# Patient Record
Sex: FEMALE | Race: WHITE | NOT HISPANIC OR LATINO | Employment: FULL TIME | URBAN - NONMETROPOLITAN AREA
[De-identification: names, ages, dates, MRNs, and addresses within clinical notes are randomized per-mention and may not be internally consistent; named-entity substitution may affect disease eponyms.]

---

## 2017-01-18 ENCOUNTER — HOSPITAL ENCOUNTER (OUTPATIENT)
Dept: GENERAL RADIOLOGY | Facility: HOSPITAL | Age: 37
Discharge: HOME | End: 2017-01-18
Attending: PHYSICIAN ASSISTANT

## 2018-07-03 ENCOUNTER — TRANSCRIBE ORDERS (OUTPATIENT)
Dept: MAMMOGRAPHY | Facility: HOSPITAL | Age: 38
End: 2018-07-03

## 2018-07-03 DIAGNOSIS — Z12.31 VISIT FOR SCREENING MAMMOGRAM: Primary | ICD-10-CM

## 2018-07-17 ENCOUNTER — HOSPITAL ENCOUNTER (OUTPATIENT)
Dept: MAMMOGRAPHY | Facility: HOSPITAL | Age: 38
Discharge: HOME | End: 2018-07-17
Attending: OBSTETRICS & GYNECOLOGY | Admitting: OBSTETRICS & GYNECOLOGY

## 2018-07-17 DIAGNOSIS — Z12.31 VISIT FOR SCREENING MAMMOGRAM: ICD-10-CM

## 2018-07-17 PROCEDURE — 77067 SCR MAMMO BI INCL CAD: CPT

## 2018-07-17 PROCEDURE — 77063 BREAST TOMOSYNTHESIS BI: CPT

## 2018-07-17 PROCEDURE — 77067 SCR MAMMO BI INCL CAD: CPT | Performed by: RADIOLOGY

## 2018-07-17 PROCEDURE — 77063 BREAST TOMOSYNTHESIS BI: CPT | Performed by: RADIOLOGY

## 2018-10-04 ENCOUNTER — APPOINTMENT (OUTPATIENT)
Dept: LAB | Facility: HOSPITAL | Age: 38
End: 2018-10-04

## 2018-10-04 ENCOUNTER — TRANSCRIBE ORDERS (OUTPATIENT)
Dept: LAB | Facility: HOSPITAL | Age: 38
End: 2018-10-04

## 2018-10-04 DIAGNOSIS — J32.9 SINUSITIS, UNSPECIFIED CHRONICITY, UNSPECIFIED LOCATION: Primary | ICD-10-CM

## 2018-10-04 DIAGNOSIS — R50.9 FEVER, UNSPECIFIED FEVER CAUSE: ICD-10-CM

## 2018-10-04 DIAGNOSIS — R53.83 FATIGUE, UNSPECIFIED TYPE: ICD-10-CM

## 2018-10-04 LAB
ALBUMIN SERPL-MCNC: 4.3 G/DL (ref 3.5–5)
ALBUMIN/GLOB SERPL: 1.1 G/DL (ref 1–2)
ALP SERPL-CCNC: 86 U/L (ref 38–126)
ALT SERPL W P-5'-P-CCNC: 27 U/L (ref 13–69)
ANION GAP SERPL CALCULATED.3IONS-SCNC: 11.6 MMOL/L (ref 10–20)
AST SERPL-CCNC: 25 U/L (ref 15–46)
BASOPHILS # BLD AUTO: 0.04 10*3/MM3 (ref 0–0.2)
BASOPHILS NFR BLD AUTO: 0.5 % (ref 0–2.5)
BILIRUB SERPL-MCNC: 0.3 MG/DL (ref 0.2–1.3)
BUN BLD-MCNC: 8 MG/DL (ref 7–20)
BUN/CREAT SERPL: 11.4 (ref 7.1–23.5)
CALCIUM SPEC-SCNC: 9.1 MG/DL (ref 8.4–10.2)
CHLORIDE SERPL-SCNC: 107 MMOL/L (ref 98–107)
CO2 SERPL-SCNC: 24 MMOL/L (ref 26–30)
CREAT BLD-MCNC: 0.7 MG/DL (ref 0.6–1.3)
DEPRECATED RDW RBC AUTO: 42.6 FL (ref 37–54)
EOSINOPHIL # BLD AUTO: 0.18 10*3/MM3 (ref 0–0.7)
EOSINOPHIL NFR BLD AUTO: 2.2 % (ref 0–7)
ERYTHROCYTE [DISTWIDTH] IN BLOOD BY AUTOMATED COUNT: 15 % (ref 11.5–14.5)
GFR SERPL CREATININE-BSD FRML MDRD: 93 ML/MIN/1.73
GLOBULIN UR ELPH-MCNC: 3.9 GM/DL
GLUCOSE BLD-MCNC: 123 MG/DL (ref 74–98)
HCT VFR BLD AUTO: 41.7 % (ref 37–47)
HGB BLD-MCNC: 13.1 G/DL (ref 12–16)
IMM GRANULOCYTES # BLD: 0.03 10*3/MM3 (ref 0–0.06)
IMM GRANULOCYTES NFR BLD: 0.4 % (ref 0–0.6)
LYMPHOCYTES # BLD AUTO: 1.58 10*3/MM3 (ref 0.6–3.4)
LYMPHOCYTES NFR BLD AUTO: 19.1 % (ref 10–50)
MCH RBC QN AUTO: 24.8 PG (ref 27–31)
MCHC RBC AUTO-ENTMCNC: 31.4 G/DL (ref 30–37)
MCV RBC AUTO: 78.8 FL (ref 81–99)
MONOCYTES # BLD AUTO: 0.43 10*3/MM3 (ref 0–0.9)
MONOCYTES NFR BLD AUTO: 5.2 % (ref 0–12)
NEUTROPHILS # BLD AUTO: 6.01 10*3/MM3 (ref 2–6.9)
NEUTROPHILS NFR BLD AUTO: 72.6 % (ref 37–80)
NRBC BLD MANUAL-RTO: 0 /100 WBC (ref 0–0)
PLATELET # BLD AUTO: 360 10*3/MM3 (ref 130–400)
PMV BLD AUTO: 10.8 FL (ref 6–12)
POTASSIUM BLD-SCNC: 3.6 MMOL/L (ref 3.5–5.1)
PROT SERPL-MCNC: 8.2 G/DL (ref 6.3–8.2)
RBC # BLD AUTO: 5.29 10*6/MM3 (ref 4.2–5.4)
SODIUM BLD-SCNC: 139 MMOL/L (ref 137–145)
WBC NRBC COR # BLD: 8.27 10*3/MM3 (ref 4.8–10.8)

## 2018-10-04 PROCEDURE — 85025 COMPLETE CBC W/AUTO DIFF WBC: CPT | Performed by: NURSE PRACTITIONER

## 2018-10-04 PROCEDURE — 80053 COMPREHEN METABOLIC PANEL: CPT | Performed by: NURSE PRACTITIONER

## 2019-01-05 ENCOUNTER — APPOINTMENT (OUTPATIENT)
Dept: LAB | Facility: HOSPITAL | Age: 39
End: 2019-01-05

## 2019-01-05 ENCOUNTER — TRANSCRIBE ORDERS (OUTPATIENT)
Dept: ADMINISTRATIVE | Facility: HOSPITAL | Age: 39
End: 2019-01-05

## 2019-01-05 DIAGNOSIS — R19.7 DIARRHEA, UNSPECIFIED TYPE: Primary | ICD-10-CM

## 2019-01-05 DIAGNOSIS — Z00.00 HEALTH MAINTENANCE EXAMINATION: ICD-10-CM

## 2019-01-05 LAB
HIV1 P24 AG SER QL: NORMAL
HIV1+2 AB SER QL: NORMAL

## 2019-01-05 PROCEDURE — 86708 HEPATITIS A ANTIBODY: CPT | Performed by: FAMILY MEDICINE

## 2019-01-05 PROCEDURE — 36415 COLL VENOUS BLD VENIPUNCTURE: CPT | Performed by: FAMILY MEDICINE

## 2019-01-05 PROCEDURE — 87899 AGENT NOS ASSAY W/OPTIC: CPT | Performed by: FAMILY MEDICINE

## 2019-01-05 PROCEDURE — G0432 EIA HIV-1/HIV-2 SCREEN: HCPCS | Performed by: FAMILY MEDICINE

## 2019-01-06 LAB
ADV 40+41 DNA STL QL NAA+NON-PROBE: NOT DETECTED
ASTRO TYP 1-8 RNA STL QL NAA+NON-PROBE: NOT DETECTED
C CAYETANENSIS DNA STL QL NAA+NON-PROBE: NOT DETECTED
C DIFF TOX GENS STL QL NAA+PROBE: NOT DETECTED
CAMPY SP DNA.DIARRHEA STL QL NAA+PROBE: NOT DETECTED
CRYPTOSP STL CULT: NOT DETECTED
E COLI DNA SPEC QL NAA+PROBE: NOT DETECTED
E HISTOLYT AG STL-ACNC: NOT DETECTED
EAEC PAA PLAS AGGR+AATA ST NAA+NON-PRB: NOT DETECTED
EC STX1 + STX2 GENES STL NAA+PROBE: NOT DETECTED
EPEC EAE GENE STL QL NAA+NON-PROBE: NOT DETECTED
ETEC LTA+ST1A+ST1B TOX ST NAA+NON-PROBE: NOT DETECTED
G LAMBLIA DNA SPEC QL NAA+PROBE: NOT DETECTED
HAV AB SER QL IA: NEGATIVE
NOROVIRUS GI+II RNA STL QL NAA+NON-PROBE: NOT DETECTED
P SHIGELLOIDES DNA STL QL NAA+NON-PROBE: NOT DETECTED
RV RNA STL NAA+PROBE: NOT DETECTED
SALMONELLA DNA SPEC QL NAA+PROBE: NOT DETECTED
SAPO I+II+IV+V RNA STL QL NAA+NON-PROBE: NOT DETECTED
SHIGELLA SP+EIEC IPAH STL QL NAA+PROBE: NOT DETECTED
V CHOLERAE DNA SPEC QL NAA+PROBE: NOT DETECTED
VIBRIO DNA SPEC NAA+PROBE: NOT DETECTED
YERSINIA STL CULT: NOT DETECTED

## 2019-01-06 PROCEDURE — 87493 C DIFF AMPLIFIED PROBE: CPT | Performed by: FAMILY MEDICINE

## 2019-01-06 PROCEDURE — 87507 IADNA-DNA/RNA PROBE TQ 12-25: CPT | Performed by: FAMILY MEDICINE

## 2019-06-18 ENCOUNTER — HOSPITAL ENCOUNTER (OUTPATIENT)
Dept: ULTRASOUND IMAGING | Facility: HOSPITAL | Age: 39
Discharge: HOME | End: 2019-06-18
Admitting: NURSE PRACTITIONER

## 2019-06-18 DIAGNOSIS — M79.605 PAIN OF LEFT LOWER EXTREMITY: ICD-10-CM

## 2019-06-18 PROCEDURE — 93971 EXTREMITY STUDY: CPT

## 2019-12-10 ENCOUNTER — TRANSCRIBE ORDERS (OUTPATIENT)
Dept: MAMMOGRAPHY | Facility: HOSPITAL | Age: 39
End: 2019-12-10

## 2019-12-10 DIAGNOSIS — Z12.31 VISIT FOR SCREENING MAMMOGRAM: Primary | ICD-10-CM

## 2019-12-23 ENCOUNTER — HOSPITAL ENCOUNTER (OUTPATIENT)
Dept: MAMMOGRAPHY | Facility: HOSPITAL | Age: 39
Discharge: HOME | End: 2019-12-23
Admitting: OBSTETRICS & GYNECOLOGY

## 2019-12-23 DIAGNOSIS — Z12.31 VISIT FOR SCREENING MAMMOGRAM: ICD-10-CM

## 2019-12-23 PROCEDURE — 77063 BREAST TOMOSYNTHESIS BI: CPT | Performed by: RADIOLOGY

## 2019-12-23 PROCEDURE — 77067 SCR MAMMO BI INCL CAD: CPT

## 2019-12-23 PROCEDURE — 77067 SCR MAMMO BI INCL CAD: CPT | Performed by: RADIOLOGY

## 2019-12-23 PROCEDURE — 77063 BREAST TOMOSYNTHESIS BI: CPT

## 2020-03-11 ENCOUNTER — HOSPITAL ENCOUNTER (OUTPATIENT)
Dept: CT IMAGING | Facility: HOSPITAL | Age: 40
Discharge: HOME | End: 2020-03-11
Admitting: PHYSICIAN ASSISTANT

## 2020-03-11 ENCOUNTER — TRANSCRIBE ORDERS (OUTPATIENT)
Dept: ADMINISTRATIVE | Facility: HOSPITAL | Age: 40
End: 2020-03-11

## 2020-03-11 DIAGNOSIS — R31.9 HEMATURIA SYNDROME: ICD-10-CM

## 2020-03-11 DIAGNOSIS — M54.9 BACK PAIN, UNSPECIFIED BACK LOCATION, UNSPECIFIED BACK PAIN LATERALITY, UNSPECIFIED CHRONICITY: ICD-10-CM

## 2020-03-11 DIAGNOSIS — R31.9 HEMATURIA SYNDROME: Primary | ICD-10-CM

## 2020-03-11 PROCEDURE — 74176 CT ABD & PELVIS W/O CONTRAST: CPT

## 2020-03-16 ENCOUNTER — APPOINTMENT (OUTPATIENT)
Dept: CT IMAGING | Facility: HOSPITAL | Age: 40
End: 2020-03-16

## 2020-04-17 LAB
BILIRUB BLD-MCNC: NEGATIVE MG/DL
CLARITY, POC: CLEAR
COLOR UR: YELLOW
GLUCOSE UR STRIP-MCNC: NEGATIVE MG/DL
KETONES UR QL: NEGATIVE
LEUKOCYTE EST, POC: NEGATIVE
NITRITE UR-MCNC: NEGATIVE MG/ML
PH UR: 6 [PH] (ref 5–8)
PROT UR STRIP-MCNC: NEGATIVE MG/DL
RBC # UR STRIP: NEGATIVE /UL
SP GR UR: 1.03 (ref 1–1.03)
UROBILINOGEN UR QL: NORMAL

## 2020-04-20 ENCOUNTER — TELEMEDICINE (OUTPATIENT)
Dept: UROLOGY | Facility: CLINIC | Age: 40
End: 2020-04-20

## 2020-04-20 DIAGNOSIS — N20.0 KIDNEY STONE: ICD-10-CM

## 2020-04-20 DIAGNOSIS — R31.29 OTHER MICROSCOPIC HEMATURIA: Primary | ICD-10-CM

## 2020-04-20 PROCEDURE — 99243 OFF/OP CNSLTJ NEW/EST LOW 30: CPT | Performed by: UROLOGY

## 2020-04-20 NOTE — PROGRESS NOTES
Chief Complaint  Kidney Stone    Referring Provider  Celeste Medrano PA    HPI  Ms. Rizwan Johnson is a 40 y.o. female who presents for further management of nephrolithiasis.    She presented to ER w/ gross hematuria and urgency and frequency and was diagnosed with a small nonobstructing right stone. She presents today for follow up.  She is asymptomatic today.  No fever, chills, nausea, vomiting.  No dysuria. Never had any procedures.     Stone related history:  Family history of kidney stones  yes  Renal disease or anatomic abnormality: yes duplicated left kidney  Malabsorptive disease or gastric bypass: no  Frequent UTI's    yes  Parathyroid disease    no    Dietary Considerations  Soda - 0 per day  Fast food - 0-2 per week  Water - 6 glasses per day  No Adds salt to foods    BMI 34    Past Medical History  Past Medical History:   Diagnosis Date   • Disease of thyroid gland    • GERD (gastroesophageal reflux disease)    • RA (rheumatoid arthritis) (CMS/[facility])    • Rheumatoid arthritis (CMS/[facility])        Past Surgical History  No past surgical history on file.    Medications    Current Outpatient Medications:   •  ALPRAZolam (XANAX) 0.25 MG tablet, Take 0.25 mg by mouth 2 (Two) Times a Day As Needed for Anxiety., Disp: , Rfl:   •  Esomeprazole Magnesium (NEXIUM PO), Take  by mouth., Disp: , Rfl:   •  fluticasone-salmeterol (ADVAIR) 500-50 MCG/DOSE DISKUS, Inhale 1 puff 2 (Two) Times a Day. Indications: Asthma, Disp: 60 each, Rfl: 0  •  ipratropium-albuterol (DUO-NEB) 0.5-2.5 mg/3 ml nebulizer, Nebulize q 4 h prn wheezing / shortness of breath, Disp: 360 mL, Rfl: 0  •  levothyroxine (SYNTHROID, LEVOTHROID) 25 MCG tablet, Take 25 mcg by mouth Daily., Disp: , Rfl:   •  montelukast (SINGULAIR) 10 MG tablet, Take 10 mg by mouth Every Night., Disp: , Rfl:   •  Tofacitinib Citrate (XELJANZ XR PO), Take  by mouth., Disp: , Rfl:     Allergies  Allergies   Allergen Reactions   • Arithmin [Antazoline] Hives   • Bactrim  [Sulfamethoxazole-Trimethoprim] Hives   • Ceclor [Cefaclor] Hives   • Keflex [Cephalexin] Hives   • Penicillins Hives       Social History  Social History     Socioeconomic History   • Marital status:      Spouse name: Not on file   • Number of children: Not on file   • Years of education: Not on file   • Highest education level: Not on file   Tobacco Use   • Smoking status: Former Smoker   • Smokeless tobacco: Never Used   Substance and Sexual Activity   • Alcohol use: Never     Frequency: Never       Family History  Family History   Problem Relation Age of Onset   • Breast cancer Neg Hx    • Ovarian cancer Neg Hx        Review of Systems  Constitutional: No fevers or chills  Skin: Negative for rash  Endocrine: No heat/cold intolerance   Cardiovascular: Negative for chest pain or dyspnea on exertion  Respiratory: Negative for shortness of breath or wheezing  Gastrointestinal: No constipation, nausea or vomiting  Genitourinary: No gross hematuria   Musculoskeletal: Negative for low back pain  Neurological:  Negative for frequent headaches or dizziness  Lymph/Heme: Negative for leg swelling or calf pain.    Physical Exam  There were no vitals taken for this visit.  Constitutional: NAD, WDWN.   HEENT: NCAT. Conjunctivae normal.  MMM.  Endocrine: no clear thyromegaly    Cardiovascular: No apparent chest masses or complaints of palpitations.  Pulmonary/Chest: Respirations are even and non-labored bilaterally.  Neurological: A + O x 3.  Cranial Nerves II-XII grossly intact.  Extremities: JORDY x 4, Warm. No clubbing.  No cyanosis.    Skin: Pink, warm and dry.  No rashes noted.  Gastrointestinal: No apparent abdominal distention    Labs  Lab Results   Component Value Date    GLUCOSE 123 (H) 10/04/2018    BUN 8 10/04/2018    CREATININE 0.70 10/04/2018    EGFRIFNONA 93 10/04/2018    BCR 11.4 10/04/2018    K 3.6 10/04/2018    CO2 24.0 (L) 10/04/2018    CALCIUM 9.1 10/04/2018    ALBUMIN 4.30 10/04/2018    LABIL2 1.0  03/04/2014    AST 25 10/04/2018    ALT 27 10/04/2018       Lab Results   Component Value Date    WBC 8.27 10/04/2018    HGB 13.1 10/04/2018    HCT 41.7 10/04/2018    MCV 78.8 (L) 10/04/2018     10/04/2018       No results found for: LDH, URICACID    Lab Results   Component Value Date    CALCIUM 9.1 10/04/2018       Brief Urine Lab Results  (Last result in the past 365 days)      Color   Clarity   Blood   Leuk Est   Nitrite   Protein   CREAT   Urine HCG        04/17/20 1321 Yellow Clear Negative Negative Negative Negative               No results found for: URINECX    )No components found for: STONEANALYSI      Radiologic Studies    Ct Abdomen Pelvis Stone Protocol    Result Date: 3/11/2020  Small nonobstructing renal stones with no hydronephrosis or ureterolithiasis.     1541.90 mGy.cm. 33.19 mGy  This study was performed with techniques to keep radiation doses as low as reasonably achievable (ALARA). Individualized dose reduction techniques using automated exposure control or adjustment of mA and/or kV according to the patient size were employed.  This report was finalized on 3/11/2020 4:31 PM by Ana M Ashby M.D..      I have personally reviewed these labs and images.  Small tiny nonobstructing lower pole right renal stone.  Duplicating left collecting system but no stones in it.  No hydronephrosis.  Full bladder.      Assessment  Ms. Rizwan Johnson is a 40 y.o. 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  Main stone risk factors are weight, family history, frequent urinary infections.  She has a duplicated kidney but I do not consider this a risk factor, as she has no hydronephrosis.      Plan  1. Litholink  2. FU w/ video visit in 3 weeks to discuss    This visit was conducted via video due to the current COVID-19 pandemic.  The patient completed a consent form at a time.

## 2020-05-11 ENCOUNTER — TELEMEDICINE (OUTPATIENT)
Dept: UROLOGY | Facility: CLINIC | Age: 40
End: 2020-05-11

## 2020-05-11 DIAGNOSIS — N20.0 NEPHROLITHIASIS: Primary | ICD-10-CM

## 2020-05-11 PROCEDURE — 99213 OFFICE O/P EST LOW 20 MIN: CPT | Performed by: UROLOGY

## 2020-05-11 NOTE — PROGRESS NOTES
Chief Complaint  Kidney Stone    Referring Provider  No ref. provider found    HPI  Ms. Rizwan Johnson is a 40 y.o. female who presents for further management of nephrolithiasis.    She presented to ER w/ gross hematuria and urgency and frequency and was diagnosed with a small nonobstructing right stone. She presents today for video follow up.  She is asymptomatic today.  No fever, chills, nausea, vomiting.  No dysuria. Never had any procedures.     Today we are reviewing her Litholink, which demonstrates low urine volume at 1.2 L, and increase urine sodium at 200.    Stone related history:  Family history of kidney stones  yes  Renal disease or anatomic abnormality: yes duplicated left kidney  Malabsorptive disease or gastric bypass: no  Frequent UTI's    yes  Parathyroid disease    no    Dietary Considerations  Soda - 0 per day  Fast food - 0-2 per week  Water - 6 glasses per day  No Adds salt to foods, admits to eating lots of processed foods, especially lunch meat.    BMI 34    Past Medical History  Past Medical History:   Diagnosis Date   • Disease of thyroid gland    • GERD (gastroesophageal reflux disease)    • RA (rheumatoid arthritis) (CMS/[facility])    • Rheumatoid arthritis (CMS/[facility])        Past Surgical History  No past surgical history on file.    Medications    Current Outpatient Medications:   •  ALPRAZolam (XANAX) 0.25 MG tablet, Take 0.25 mg by mouth 2 (Two) Times a Day As Needed for Anxiety., Disp: , Rfl:   •  Esomeprazole Magnesium (NEXIUM PO), Take  by mouth., Disp: , Rfl:   •  fluticasone-salmeterol (ADVAIR) 500-50 MCG/DOSE DISKUS, Inhale 1 puff 2 (Two) Times a Day. Indications: Asthma, Disp: 60 each, Rfl: 0  •  ipratropium-albuterol (DUO-NEB) 0.5-2.5 mg/3 ml nebulizer, Nebulize q 4 h prn wheezing / shortness of breath, Disp: 360 mL, Rfl: 0  •  levothyroxine (SYNTHROID, LEVOTHROID) 25 MCG tablet, Take 25 mcg by mouth Daily., Disp: , Rfl:   •  montelukast (SINGULAIR) 10 MG tablet, Take 10 mg by mouth  Every Night., Disp: , Rfl:   •  Tofacitinib Citrate (XELJANZ XR PO), Take  by mouth., Disp: , Rfl:     Allergies  Allergies   Allergen Reactions   • Arithmin [Antazoline] Hives   • Bactrim [Sulfamethoxazole-Trimethoprim] Hives   • Ceclor [Cefaclor] Hives   • Keflex [Cephalexin] Hives   • Penicillins Hives       Social History  Social History     Socioeconomic History   • Marital status:      Spouse name: Not on file   • Number of children: Not on file   • Years of education: Not on file   • Highest education level: Not on file   Tobacco Use   • Smoking status: Former Smoker   • Smokeless tobacco: Never Used   Substance and Sexual Activity   • Alcohol use: Never     Frequency: Never       Family History  Family History   Problem Relation Age of Onset   • Breast cancer Neg Hx    • Ovarian cancer Neg Hx        Review of Systems  Constitutional: No fevers or chills  Skin: Negative for rash  Endocrine: No heat/cold intolerance   Cardiovascular: Negative for chest pain or dyspnea on exertion  Respiratory: Negative for shortness of breath or wheezing  Gastrointestinal: No constipation, nausea or vomiting  Genitourinary: No gross hematuria   Musculoskeletal: Negative for low back pain  Neurological:  Negative for frequent headaches or dizziness  Lymph/Heme: Negative for leg swelling or calf pain.    Physical Exam  There were no vitals taken for this visit.  Constitutional: NAD, WDWN.   HEENT: NCAT. Conjunctivae normal.  MMM.  Endocrine: no clear thyromegaly    Cardiovascular: No apparent chest masses or complaints of palpitations.  Pulmonary/Chest: Respirations are even and non-labored bilaterally.  Neurological: A + O x 3.  Cranial Nerves II-XII grossly intact.  Extremities: JORDY x 4, Warm. No clubbing.  No cyanosis.    Skin: Pink, warm and dry.  No rashes noted.  Gastrointestinal: No apparent abdominal distention    Labs  Lab Results   Component Value Date    GLUCOSE 123 (H) 10/04/2018    BUN 8 10/04/2018     CREATININE 0.70 10/04/2018    EGFRIFNONA 93 10/04/2018    BCR 11.4 10/04/2018    K 3.6 10/04/2018    CO2 24.0 (L) 10/04/2018    CALCIUM 9.1 10/04/2018    ALBUMIN 4.30 10/04/2018    LABIL2 1.0 03/04/2014    AST 25 10/04/2018    ALT 27 10/04/2018       Lab Results   Component Value Date    WBC 8.27 10/04/2018    HGB 13.1 10/04/2018    HCT 41.7 10/04/2018    MCV 78.8 (L) 10/04/2018     10/04/2018       No results found for: LDH, URICACID    Lab Results   Component Value Date    CALCIUM 9.1 10/04/2018       Brief Urine Lab Results  (Last result in the past 365 days)      Color   Clarity   Blood   Leuk Est   Nitrite   Protein   CREAT   Urine HCG        04/17/20 1321 Yellow Clear Negative Negative Negative Negative               No results found for: URINECX    )No components found for: STONEANALYSI      Radiologic Studies    Ct Abdomen Pelvis Stone Protocol    Result Date: 3/11/2020  Small nonobstructing renal stones with no hydronephrosis or ureterolithiasis.     1541.90 mGy.cm. 33.19 mGy  This study was performed with techniques to keep radiation doses as low as reasonably achievable (ALARA). Individualized dose reduction techniques using automated exposure control or adjustment of mA and/or kV according to the patient size were employed.  This report was finalized on 3/11/2020 4:31 PM by Ana M Ashby M.D..      I have personally reviewed these labs and images.  Small tiny nonobstructing lower pole right renal stone.  Duplicating left collecting system but no stones in it.  No hydronephrosis.  Full bladder.      Assessment  Ms. Rizwan Johnson is a 40 y.o. 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  Main stone risk factors are weight, family history, frequent urinary infections.  She has a duplicated kidney but I do not consider this a risk factor, as she has no hydronephrosis.    Her litholink demonstrated urine volume of 1.2L and increased urine sodium.  She does admit to eating a large  amount of processed and preserved lunch meats.      Plan  1. FU in 1 yr w/ KUB  2.  Increase water intake by 1.5 times, reduce sodium intake.    This was an audio and video enabled telemedicine encounter.  Consent was obtained ahead of time.

## 2020-06-03 ENCOUNTER — OFFICE VISIT (OUTPATIENT)
Dept: ORTHOPEDIC SURGERY | Facility: CLINIC | Age: 40
End: 2020-06-03

## 2020-06-03 VITALS — WEIGHT: 207 LBS | BODY MASS INDEX: 35.34 KG/M2 | RESPIRATION RATE: 18 BRPM | HEIGHT: 64 IN

## 2020-06-03 DIAGNOSIS — Z87.39 PERSONAL HISTORY OF RHEUMATOID ARTHRITIS: ICD-10-CM

## 2020-06-03 DIAGNOSIS — M25.562 ARTHRALGIA OF LEFT KNEE: Primary | ICD-10-CM

## 2020-06-03 DIAGNOSIS — M77.51 RETROCALCANEAL BURSITIS (BACK OF HEEL), RIGHT: ICD-10-CM

## 2020-06-03 DIAGNOSIS — M76.61 ACHILLES TENDINITIS, RIGHT LEG: ICD-10-CM

## 2020-06-03 PROCEDURE — 99203 OFFICE O/P NEW LOW 30 MIN: CPT | Performed by: PHYSICIAN ASSISTANT

## 2020-06-03 RX ORDER — ALBUTEROL SULFATE 0.63 MG/3ML
SOLUTION RESPIRATORY (INHALATION)
COMMUNITY
Start: 2020-04-07 | End: 2023-04-04

## 2020-06-03 RX ORDER — TOFACITINIB 11 MG/1
11 TABLET, FILM COATED, EXTENDED RELEASE ORAL DAILY
COMMUNITY
Start: 2020-05-11

## 2020-06-03 NOTE — PROGRESS NOTES
Subjective   Patient ID: Ric Johnson is a 40 y.o. right hand dominant female  Pain of the Left Knee (No specific injury, reports she bent down to  something a couple weeks ago and heard a pop and experienced sudden pain and swelling at the back of the knee, seen at [facility] 5/22, better since then)         History of Present Illness    Patient presents with complaints of intermittent left knee pain without injury or trauma.  The pain began several weeks prior.  Pain was located to the inner aspect of the left knee.  She went to the urgent care had x-rays was told she had arthritis.  Patient does have a personal history of rheumatoid arthritis and does not like to take anti-inflammatories.  There is no instability of the left knee.  No mechanical locking of the left knee.  She states some days the left knee pain is noticeable and other days it is not as bad.    Patient would also like to have her right posterior ankle evaluated.  She states she is experienced right posterior ankle pain for several years.  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   She has tried shoe inserts without improvement.    Pain Score: 5  Pain Location: Knee                                           Past Medical History:   Diagnosis Date   • Disease of thyroid gland    • GERD (gastroesophageal reflux disease)    • RA (rheumatoid arthritis) (CMS/[facility])    • Rheumatoid arthritis (CMS/[facility])         History reviewed. No pertinent surgical history.    Family History   Problem Relation Age of Onset   • Breast cancer Neg Hx    • Ovarian cancer Neg Hx        Social History     Socioeconomic History   • Marital status:      Spouse name: Not on file   • Number of children: Not on file   • Years of education: Not on file   • Highest education level: Not on file   Tobacco Use   • Smoking status: Former Smoker   • Smokeless tobacco: Never Used   Substance and Sexual Activity    • Alcohol use: Never     Frequency: Never   • Sexual activity: Defer         Current Outpatient Medications:   •  albuterol (ACCUNEB) 0.63 MG/3ML nebulizer solution, , Disp: , Rfl:   •  ALPRAZolam (XANAX) 0.25 MG tablet, Take 0.25 mg by mouth 2 (Two) Times a Day As Needed for Anxiety., Disp: , Rfl:   •  Cholecalciferol (VITAMIN D3) 125 MCG (5000 UT) capsule capsule, Take 5,000 Units by mouth Daily., Disp: , Rfl:   •  diclofenac (VOLTAREN) 1 % gel gel, Apply 4 g topically to the appropriate area as directed 4 (Four) Times a Day As Needed (as needed for pain)., Disp: 1 tube, Rfl: 0  •  Esomeprazole Magnesium (NEXIUM PO), Take  by mouth., Disp: , Rfl:   •  levothyroxine (SYNTHROID, LEVOTHROID) 25 MCG tablet, Take 25 mcg by mouth Daily., Disp: , Rfl:   •  montelukast (SINGULAIR) 10 MG tablet, Take 10 mg by mouth Every Night., Disp: , Rfl:   •  Tofacitinib Citrate (XELJANZ XR PO), Take  by mouth., Disp: , Rfl:   •  vitamin C (ASCORBIC ACID) 500 MG tablet, Take 1,000 mg by mouth Daily., Disp: , Rfl:   •  XELJANZ XR 11 MG tablet sustained-release 24 hour, , Disp: , Rfl:     Allergies   Allergen Reactions   • Arithmin [Antazoline] Hives   • Bactrim [Sulfamethoxazole-Trimethoprim] Hives   • Ceclor [Cefaclor] Hives   • Keflex [Cephalexin] Hives   • Penicillins Hives       Review of Systems   Constitutional: Negative for diaphoresis, fever and unexpected weight change.   HENT: Negative for dental problem and sore throat.    Eyes: Negative for visual disturbance.   Respiratory: Negative for shortness of breath.    Cardiovascular: Negative for chest pain.   Gastrointestinal: Negative for abdominal pain, constipation, diarrhea, nausea and vomiting.   Genitourinary: Negative for difficulty urinating and frequency.   Musculoskeletal: Positive for arthralgias.   Neurological: Negative for headaches.   Hematological: Does not bruise/bleed easily.       I have reviewed the medical and surgical history, family history, social  "history, medications, and/or allergies, and the review of systems of this report.    Objective   Resp 18   Ht 162.6 cm (64\")   Wt 93.9 kg (207 lb)   BMI 35.53 kg/m²    Physical Exam   Constitutional: She is oriented to person, place, and time. She appears well-developed and well-nourished.   Pulmonary/Chest: Effort normal.   Musculoskeletal:        Left knee: She exhibits abnormal meniscus. She exhibits normal range of motion, no swelling, no effusion, no ecchymosis, no deformity, no laceration, no LCL laxity, no bony tenderness and no MCL laxity. Tenderness found. Medial joint line and MCL tenderness noted. No patellar tendon tenderness noted.        Right ankle: She exhibits no swelling and no ecchymosis. No lateral malleolus, no medial malleolus, no AITFL, no CF ligament, no head of 5th metatarsal and no proximal fibula tenderness found. Achilles tendon exhibits pain. Achilles tendon exhibits no defect and normal Whitney's test results.        Right foot: There is normal range of motion, no tenderness and no bony tenderness.        Neurological: She is alert and oriented to person, place, and time.   Psychiatric: She has a normal mood and affect.   Nursing note and vitals reviewed.    Right Ankle Exam     Muscle Strength   The patient has normal right ankle strength.    Tests   Anterior drawer: negative  Varus tilt: negative    Other   Erythema: absent  Sensation: normal  Pulse: present       Left Knee Exam     Range of Motion   Extension: 5   Flexion: 100     Tests   Madalyn:  Medial - positive Lateral - negative  Varus: negative Valgus: negative    Other   Sensation: normal  Pulse: present  Effusion: no effusion present           Extremity DVT signs are negative on physical exam with negative Rambo sign, no calf pain, no palpable cords and no skin tone change   Neurologic Exam     Mental Status   Oriented to person, place, and time.              Assessment/Plan   Independent Review of Radiographic Studies: "    FINAL REPORT     CLINICAL HISTORY:  right ankle pain     FINDINGS:  RIGHT ANKLE:  Three views of the right ankle were obtained.  There is no acute fracture or dislocation. The mortise is  intact.  There is moderate degenerative change of the tibiotalar  joint.  There is a small tibiotalar joint effusion.  There is a  small plantar calcaneal spur.     IMPRESSION:  Moderate degenerative changes at the tibiotalar joint with small  joint effusion.     Authenticated by Charles Christian III, MD on 05/13/2019  07:48:28 AM    X-ray of the left knee 2 views weightbearing performed in the office for the evaluation of knee pain.  No comparison films are available to review.  No acute fracture.  There does appear to be medial joint space narrowing with medial distal femoral condyle spurring    Procedures       Ric was seen today for pain.    Diagnoses and all orders for this visit:    Arthralgia of left knee  -     XR Knee 1 or 2 View Left    Personal history of rheumatoid arthritis    Achilles tendinitis, right leg    Retrocalcaneal bursitis (back of heel), right       Orthopedic activities reviewed and patient expressed appreciation  Discussion of orthopedic goals  Risk, benefits, and merits of treatment alternatives reviewed with the patient and questions answered  Reduced physical activity as appropriate  Ice, heat, and/or modalities as beneficial    Recommendations/Plan:  Exercise, medications, injections, other patient advice, and return appointment as noted.  Patient is encouraged to call or return for any issues or concerns.    Patient agreeable to call or return sooner for any concerns.  She may RTC if the left knee symptoms worsen/change-  Cortisone injection and MRI of left knee in future.     Wear the pneumatic boot to right foot/ankle X 4 - 6 weeks-if no improvement may need MRI, formal therapy and referral to foot and ankle subspecialist    Patient states she does have a pneumatic boot at home that was  provided in May 2019.  She is encouraged to use the pneumatic boot to rest the Achilles tendon and bursa ice the ankle and apply topical Voltaren gel 3 times daily as directed.  Avoid flip-flops, sandals and slip on shoes.      Patient politely declined a left knee cortisone injection today stating the pain is not severe enough for injection             EMR Dragon-transcription disclaimer:  This encounter note is an electronic transcription of spoken language to printed text.  Electronic transcription of spoken language may permit erroneous or at times nonsensical words or phrases to be inadvertently transcribed.  Although I have reviewed the note for such errors, some may still exist  Answers for HPI/ROS submitted by the patient on 6/2/2020   What is the primary reason for your visit?: Other  Please describe your symptoms.: Pain in left knee and leg.  Have you had these symptoms before?: No  How long have you been having these symptoms?: 1-2 weeks  Please describe any probable cause for these symptoms. : I bent to pick something up, and my knee popped loudly. It’s been hurting since.

## 2020-12-29 ENCOUNTER — OFFICE VISIT (OUTPATIENT)
Dept: OBSTETRICS AND GYNECOLOGY | Facility: CLINIC | Age: 40
End: 2020-12-29

## 2020-12-29 VITALS
BODY MASS INDEX: 34.55 KG/M2 | SYSTOLIC BLOOD PRESSURE: 122 MMHG | WEIGHT: 195 LBS | HEIGHT: 63 IN | DIASTOLIC BLOOD PRESSURE: 78 MMHG

## 2020-12-29 DIAGNOSIS — Z98.890 HISTORY OF LOOP ELECTRICAL EXCISION PROCEDURE (LEEP): ICD-10-CM

## 2020-12-29 DIAGNOSIS — Z00.00 ANNUAL PHYSICAL EXAM: Primary | ICD-10-CM

## 2020-12-29 DIAGNOSIS — Z87.42 HISTORY OF ABNORMAL CERVICAL PAP SMEAR: ICD-10-CM

## 2020-12-29 DIAGNOSIS — Z30.431 IUD CHECK UP: ICD-10-CM

## 2020-12-29 PROCEDURE — 99396 PREV VISIT EST AGE 40-64: CPT | Performed by: OBSTETRICS & GYNECOLOGY

## 2020-12-29 RX ORDER — ALBUTEROL SULFATE 90 UG/1
AEROSOL, METERED RESPIRATORY (INHALATION)
COMMUNITY
Start: 2020-11-11 | End: 2023-02-17

## 2020-12-29 NOTE — PROGRESS NOTES
GYN Annual Exam   She called her make her mammogram apt and she had a breast lump so they would not schedule her apt.    CC - Here for annual exam.   Several antibiotic allergies - can take Suprax Cipro and Cefdinir     Subjective   HPI  Ric Johnson is a 40 y.o. female, , who presents for annual well woman exam and breast lump.  Patient's last menstrual period was 2020 (exact date).  Periods are rare with her Mirena IUD, placed 2018 for irreg periods and uterine fibroids. Dysmenorrhea:mild, occurring first 1-2 days of flow.  Patient reports problems with: breast lump under her right underarm  Partner Status: Marital Status: .  New Partners since last visit: no.  Desires STD Screening:  no.    Additional OB/GYN History   Current contraception: IUD  Last Pap :   Last Completed Pap Smear       Status Date      PAP SMEAR Done 2019 in [place]        History of abnormal Pap smear: yes LEEP  Family history of uterine, colon, breast, or ovarian cancer: no  Performs monthly Self-Breast Exam: yes - she is not comfortable with her exams  Last mammogram:   Last Completed Mammogram       Status Date      MAMMOGRAM Done 2019 MAMMO SCREENING DIGITAL TOMOSYNTHESIS BILATERAL W CAD     Patient has more history with this topic...        Feelings of Anxiety or Depression: yes stable  Tobacco Usage?: No   OB History        4    Para   1    Term   1            AB   3    Living   1       SAB   3    TAB        Ectopic        Molar        Multiple        Live Births   1                Health Maintenance   Topic Date Due   • Annual Gynecologic Pelvic and Breast Exam  1978   • ANNUAL PHYSICAL  1981   • Pneumococcal Vaccine 0-64 (1 of 1 - PPSV23) 1984   • HEPATITIS C SCREENING  2020   • INFLUENZA VACCINE  2020   • MAMMOGRAM  2020   • PAP SMEAR  2022   • TDAP/TD VACCINES (2 - Tdap) 2030   • MENINGOCOCCAL VACCINE  Aged Out         Current  Outpatient Medications:   •  albuterol sulfate  (90 Base) MCG/ACT inhaler, , Disp: , Rfl:   •  ALPRAZolam (XANAX) 0.25 MG tablet, Take 0.25 mg by mouth 2 (Two) Times a Day As Needed for Anxiety., Disp: , Rfl:   •  Cholecalciferol (VITAMIN D3) 125 MCG (5000 UT) capsule capsule, Take 5,000 Units by mouth Daily., Disp: , Rfl:   •  Esomeprazole Magnesium (NEXIUM PO), Take  by mouth., Disp: , Rfl:   •  levonorgestrel (Mirena, 52 MG,) 20 MCG/24HR IUD, 1 each by Intrauterine route 1 (One) Time. Placed 7/17/2018 with Dr. Munoz, Disp: , Rfl:   •  levothyroxine (SYNTHROID, LEVOTHROID) 25 MCG tablet, Take 25 mcg by mouth Daily., Disp: , Rfl:   •  montelukast (SINGULAIR) 10 MG tablet, Take 10 mg by mouth Every Night., Disp: , Rfl:   •  Tofacitinib Citrate (XELJANZ XR PO), Take  by mouth., Disp: , Rfl:   •  vitamin C (ASCORBIC ACID) 500 MG tablet, Take 1,000 mg by mouth Daily., Disp: , Rfl:   •  albuterol (ACCUNEB) 0.63 MG/3ML nebulizer solution, , Disp: , Rfl:   •  diclofenac (VOLTAREN) 1 % gel gel, Apply 4 g topically to the appropriate area as directed 4 (Four) Times a Day As Needed (as needed for pain)., Disp: 1 tube, Rfl: 0  •  XELJANZ XR 11 MG tablet sustained-release 24 hour, , Disp: , Rfl:     The additional following portions of the patient's history were reviewed and updated as appropriate: allergies, current medications, past family history, past medical history, past social history, past surgical history and problem list.    Review of Systems   Constitutional: Negative.    HENT: Negative.    Eyes: Negative.    Respiratory: Negative.    Cardiovascular: Negative.    Gastrointestinal: Negative.    Endocrine: Negative.    Genitourinary: Negative.  Positive for breast lump and breast pain.   Musculoskeletal: Negative.    Neurological: Negative.    Hematological: Negative.    Psychiatric/Behavioral: Negative.        I have reviewed and agree with the HPI, ROS, and historical information as entered above. Han  "Los Munoz MD    Objective   /78   Ht 160 cm (63\")   Wt 88.5 kg (195 lb)   LMP 12/26/2020 (Exact Date)   Breastfeeding No   BMI 34.54 kg/m²     PE    Breast: Without masses ,nontender, no skin changes or retractions  Axilla: Normal, no lymphadenopathy  Heart: Regular rate no murmurs rubs or gallops  Lungs: Clear to auscultation, normal breath sounds bilaterally  Abdomen: Soft nontender, no hepatosplenomegaly, no guarding or rebound, no masses  Pelvic exam  External genitalia: Normal introitus and vulva  Vagina: Normal mucosa no bleeding inflammation or discharge  Bladder: Normal position nontender  Urethral meatus and urethra: Normal nontender  Cervix: No lesions, no discharge, bleeding or inflammation, IUD string visible  Bimanual: Nontender adnexa clear, no sign of uterine or ovarian enlargement  Anal: No external lesions or hemorrhoids    Breast exam normal patient will get a mammogram soon         Assessment/Plan     Assessment     Problem List Items Addressed This Visit        Other    IUD check up    Annual physical exam - Primary    Relevant Orders    Pap IG, HPV-hr    Mammo Screening Digital Tomosynthesis Bilateral With CAD    History of abnormal cervical Pap smear      Other Visit Diagnoses     History of loop electrical excision procedure (LEEP)        Relevant Orders    Pap IG, HPV-hr          1. GYN annual well woman exam.   2. Normal exam IUD in place    Plan     1. Next pap due in: 1 year  2. Fibrocystic breast changes - Encouraged decreasing caffeine, supportive bra, low dose vitamin E supplementation.  3. Reviewed HPV guidelines and she would like to continue yearly paps regardless.   4. Reviewed monthly self breast exams.  Instructed to call with lumps, pain, or breast discharge.  Yearly mammograms ordered.  5. Reviewed exercise and weight loss as preventative health measures.  Activity recommends - Adult 150-300 min/week of multi-component physical activities that include balance " training, aerobic and physical strengthening.  Disabled or ill adults should still try to fulfill these requirements, with modifications based on their conditions.  Patient with a history of abnormal Paps and a LEEP will get yearly Paps for now  Healthy lifestyle changes including diet and exercise reviewed  Preventative health initiatives reviewed    Han Munoz MD  12/29/2020

## 2021-01-04 DIAGNOSIS — Z00.00 ANNUAL PHYSICAL EXAM: ICD-10-CM

## 2021-01-04 DIAGNOSIS — Z98.890 HISTORY OF LOOP ELECTRICAL EXCISION PROCEDURE (LEEP): ICD-10-CM

## 2021-01-11 ENCOUNTER — APPOINTMENT (OUTPATIENT)
Dept: MAMMOGRAPHY | Facility: HOSPITAL | Age: 41
End: 2021-01-11

## 2021-01-12 ENCOUNTER — HOSPITAL ENCOUNTER (OUTPATIENT)
Dept: MAMMOGRAPHY | Facility: HOSPITAL | Age: 41
Discharge: HOME | End: 2021-01-12
Admitting: OBSTETRICS & GYNECOLOGY

## 2021-01-12 DIAGNOSIS — Z00.00 ANNUAL PHYSICAL EXAM: ICD-10-CM

## 2021-01-12 PROCEDURE — 77067 SCR MAMMO BI INCL CAD: CPT

## 2021-01-12 PROCEDURE — 77063 BREAST TOMOSYNTHESIS BI: CPT

## 2021-01-12 PROCEDURE — 77067 SCR MAMMO BI INCL CAD: CPT | Performed by: RADIOLOGY

## 2021-01-12 PROCEDURE — 77063 BREAST TOMOSYNTHESIS BI: CPT | Performed by: RADIOLOGY

## 2021-01-21 PROCEDURE — U0004 COV-19 TEST NON-CDC HGH THRU: HCPCS | Performed by: PHYSICIAN ASSISTANT

## 2021-01-29 ENCOUNTER — TRANSCRIBE ORDERS (OUTPATIENT)
Dept: ADMINISTRATIVE | Facility: HOSPITAL | Age: 41
End: 2021-01-29

## 2021-01-29 ENCOUNTER — HOSPITAL ENCOUNTER (OUTPATIENT)
Dept: CT IMAGING | Facility: HOSPITAL | Age: 41
Discharge: HOME | End: 2021-01-29
Admitting: NURSE PRACTITIONER

## 2021-01-29 DIAGNOSIS — R31.9 HEMATURIA, UNSPECIFIED TYPE: Primary | ICD-10-CM

## 2021-01-29 DIAGNOSIS — R31.9 HEMATURIA, UNSPECIFIED TYPE: ICD-10-CM

## 2021-01-29 PROCEDURE — 74176 CT ABD & PELVIS W/O CONTRAST: CPT

## 2021-02-12 ENCOUNTER — TRANSCRIBE ORDERS (OUTPATIENT)
Dept: ADMINISTRATIVE | Facility: HOSPITAL | Age: 41
End: 2021-02-12

## 2021-02-12 DIAGNOSIS — R10.32 LLQ ABDOMINAL PAIN: Primary | ICD-10-CM

## 2021-02-12 DIAGNOSIS — R10.2 PELVIC PAIN IN FEMALE: ICD-10-CM

## 2021-03-05 ENCOUNTER — HOSPITAL ENCOUNTER (OUTPATIENT)
Dept: ULTRASOUND IMAGING | Facility: HOSPITAL | Age: 41
Discharge: HOME | End: 2021-03-05
Admitting: PHYSICIAN ASSISTANT

## 2021-03-05 DIAGNOSIS — R10.2 PELVIC PAIN IN FEMALE: ICD-10-CM

## 2021-03-05 PROCEDURE — 76830 TRANSVAGINAL US NON-OB: CPT

## 2021-04-05 ENCOUNTER — LAB (OUTPATIENT)
Dept: LAB | Facility: HOSPITAL | Age: 41
End: 2021-04-05

## 2021-04-05 ENCOUNTER — OFFICE VISIT (OUTPATIENT)
Dept: ENDOCRINOLOGY | Facility: CLINIC | Age: 41
End: 2021-04-05

## 2021-04-05 ENCOUNTER — OFFICE VISIT (OUTPATIENT)
Dept: OBSTETRICS AND GYNECOLOGY | Facility: CLINIC | Age: 41
End: 2021-04-05

## 2021-04-05 VITALS
BODY MASS INDEX: 35.17 KG/M2 | SYSTOLIC BLOOD PRESSURE: 126 MMHG | WEIGHT: 206 LBS | HEIGHT: 64 IN | DIASTOLIC BLOOD PRESSURE: 78 MMHG

## 2021-04-05 VITALS
WEIGHT: 208 LBS | BODY MASS INDEX: 36.86 KG/M2 | TEMPERATURE: 97.8 F | HEIGHT: 63 IN | OXYGEN SATURATION: 97 % | HEART RATE: 80 BPM | DIASTOLIC BLOOD PRESSURE: 64 MMHG | SYSTOLIC BLOOD PRESSURE: 124 MMHG

## 2021-04-05 DIAGNOSIS — E04.1 SOLITARY THYROID NODULE: ICD-10-CM

## 2021-04-05 DIAGNOSIS — E06.3 HASHIMOTO'S THYROIDITIS: Primary | ICD-10-CM

## 2021-04-05 DIAGNOSIS — E06.3 HASHIMOTO'S THYROIDITIS: ICD-10-CM

## 2021-04-05 DIAGNOSIS — R39.15 URINARY URGENCY: ICD-10-CM

## 2021-04-05 DIAGNOSIS — Z30.431 IUD CHECK UP: Primary | ICD-10-CM

## 2021-04-05 DIAGNOSIS — D25.9 UTERINE LEIOMYOMA, UNSPECIFIED LOCATION: ICD-10-CM

## 2021-04-05 DIAGNOSIS — R35.0 URINARY FREQUENCY: ICD-10-CM

## 2021-04-05 PROBLEM — R10.2 PELVIC PAIN: Status: ACTIVE | Noted: 2021-04-05

## 2021-04-05 LAB — TSH SERPL DL<=0.05 MIU/L-ACNC: 0.76 UIU/ML (ref 0.27–4.2)

## 2021-04-05 PROCEDURE — 99213 OFFICE O/P EST LOW 20 MIN: CPT | Performed by: OBSTETRICS & GYNECOLOGY

## 2021-04-05 PROCEDURE — 84443 ASSAY THYROID STIM HORMONE: CPT

## 2021-04-05 PROCEDURE — 99214 OFFICE O/P EST MOD 30 MIN: CPT | Performed by: INTERNAL MEDICINE

## 2021-04-05 NOTE — PROGRESS NOTES
GYN NOTE    HPI: Ric Johnson is a 41 y.o. G 4 P 1  Possible was told her pain was possibly related to uterine fibroid. She said that has been documented since 2014 in [facility] records    She had a recent CT Scan and US with her PCP for pelvic pain, pain radiating down both legs and pain with intercourse. They thought she had a kidney stone, never positive for UTI, No fever  Mirena inserted 7/17/2018     Allergies   Allergen Reactions   • Arithmin [Antazoline] Hives   • Bactrim [Sulfamethoxazole-Trimethoprim] Hives   • Ceclor [Cefaclor] Hives   • Keflex [Cephalexin] Hives   • Penicillins Hives         Current Outpatient Medications:   •  albuterol (ACCUNEB) 0.63 MG/3ML nebulizer solution, , Disp: , Rfl:   •  albuterol sulfate  (90 Base) MCG/ACT inhaler, , Disp: , Rfl:   •  ALPRAZolam (XANAX) 0.25 MG tablet, Take 0.25 mg by mouth 2 (Two) Times a Day As Needed for Anxiety., Disp: , Rfl:   •  Cholecalciferol (VITAMIN D3) 125 MCG (5000 UT) capsule capsule, Take 5,000 Units by mouth Daily., Disp: , Rfl:   •  diclofenac (VOLTAREN) 1 % gel gel, Apply 4 g topically to the appropriate area as directed 4 (Four) Times a Day As Needed (as needed for pain)., Disp: 1 tube, Rfl: 0  •  Esomeprazole Magnesium (NEXIUM PO), Take  by mouth., Disp: , Rfl:   •  levonorgestrel (Mirena, 52 MG,) 20 MCG/24HR IUD, 1 each by Intrauterine route 1 (One) Time. Placed 7/17/2018 with Dr. Munoz, Disp: , Rfl:   •  levothyroxine (SYNTHROID, LEVOTHROID) 25 MCG tablet, Take 25 mcg by mouth Daily., Disp: , Rfl:   •  montelukast (SINGULAIR) 10 MG tablet, Take 10 mg by mouth Every Night., Disp: , Rfl:   •  vitamin C (ASCORBIC ACID) 500 MG tablet, Take 1,000 mg by mouth Daily., Disp: , Rfl:   •  XELJANZ XR 11 MG tablet sustained-release 24 hour, , Disp: , Rfl:   •  ondansetron ODT (ZOFRAN-ODT) 4 MG disintegrating tablet, Place 1 tablet on the tongue Every 8 (Eight) Hours As Needed for Nausea or Vomiting., Disp: 15 tablet, Rfl: 0    Past Medical  History:   Diagnosis Date   • Abnormal Pap smear of cervix    • Disease of thyroid gland    • Fibrocystic breast    • GERD (gastroesophageal reflux disease)    • Hashimoto's thyroiditis    • History of miscarriage    • Kidney stone    • Non-toxic multinodular goiter    • RA (rheumatoid arthritis) (CMS/HCC)    • Rheumatoid arthritis (CMS/HCC)    • Urethral stricture    • Uterine fibroid        Past Surgical History:   Procedure Laterality Date   • CERVICAL BIOPSY  W/ LOOP ELECTRODE EXCISION         ROS:   General-increased urinary urgency and urinary frequency, intermittent pelvic pain, small leiomyoma      PE: afeb VSS    Abd: Soft nontender  Pelvic: External genitalia normal  Vagina normal  IUD string seen  Bimanual nontender  Bladder slightly tender to palpation    Assessment:   Intermittent pelvic pain  Urinary frequency and urgency  Small uterine leiomyoma      Plan: See urology      Chart and PMH reviewed.    Han Munoz MD  04/05/2021

## 2021-04-05 NOTE — PROGRESS NOTES
"     Office Note      Date: 2021  Patient Name: Ric Johnson  MRN: [id number]  : 1980    Chief Complaint   Patient presents with   • Thyroid Problem       History of Present Illness:   Ric Johnson is a 41 y.o. female who presents for Thyroid Problem  she had covid about 6 weeks ago.  She has a  Thyroid nodule and + serologies for  Hashimoto's. She is on low dose T4 for suppression.  There were no other changes to her medical history.    She notes no changes in her neck.  She has issues with pelvic pain. She has been diagnosed with fibroids and cysts.     Subjective          Review of Systems:   Review of Systems   Constitutional: Positive for unexpected weight change.   Endocrine: Positive for cold intolerance.   Musculoskeletal: Positive for arthralgias.   All other systems reviewed and are negative.      The following portions of the patient's history were reviewed and updated as appropriate: allergies, current medications, past family history, past medical history, past social history, past surgical history and problem list.    Objective     Visit Vitals  /64 (BP Location: Left arm, Patient Position: Sitting, Cuff Size: Adult)   Pulse 80   Temp 97.8 °F (36.6 °C) (Infrared)   Ht 160 cm (63\")   Wt 94.3 kg (208 lb)   SpO2 97%   BMI 36.85 kg/m²       Labs:    CBC w/DIFF  Lab Results   Component Value Date    WBC 8.27 10/04/2018    RBC 5.29 10/04/2018    HGB 13.1 10/04/2018    HCT 41.7 10/04/2018    MCV 78.8 (L) 10/04/2018    MCH 24.8 (L) 10/04/2018    MCHC 31.4 10/04/2018    RDW 15.0 (H) 10/04/2018    RDWSD 42.6 10/04/2018    MPV 10.8 10/04/2018     10/04/2018    NEUTRORELPCT 72.6 10/04/2018    LYMPHORELPCT 19.1 10/04/2018    MONORELPCT 5.2 10/04/2018    EOSRELPCT 2.2 10/04/2018    BASORELPCT 0.5 10/04/2018    AUTOIGPER 0.4 10/04/2018    NEUTROABS 6.01 10/04/2018    LYMPHSABS 1.58 10/04/2018    MONOSABS 0.43 10/04/2018    EOSABS 0.18 10/04/2018    BASOSABS 0.04 10/04/2018    " AUTOIGNUM 0.03 10/04/2018    NRBC 0.0 10/04/2018       T4  No results found for: FREET4    TSH  No results found for: TSHBASE     Physical Exam:  Physical Exam  Vitals reviewed.   Neck:      Comments: Thyroid is larger than a normal thyroid but smaller than it had been in the past   Lymphadenopathy:      Cervical: No cervical adenopathy.   Neurological:      Mental Status: She is oriented to person, place, and time.   Psychiatric:         Mood and Affect: Mood normal.         Thought Content: Thought content normal.         Judgment: Judgment normal.         Assessment / Plan      Assessment & Plan:  Problem List Items Addressed This Visit        Other    Solitary thyroid nodule    Overview     Hx of sub cm thyroid nodule          Current Assessment & Plan     Improved based upon exam          Relevant Medications    levothyroxine (SYNTHROID, LEVOTHROID) 25 MCG tablet    Hashimoto's thyroiditis - Primary    Overview     + serologies with preserved thyroid function  On low dose t4 to suppress inflammation          Current Assessment & Plan     Clinically euthyroid. Will check levels and adjust dose as indicated by results          Relevant Medications    levothyroxine (SYNTHROID, LEVOTHROID) 25 MCG tablet    XELJANZ XR 11 MG tablet sustained-release 24 hour    Other Relevant Orders    TSH           Herb Rodriguez MD   04/05/2021

## 2021-04-06 RX ORDER — LEVOTHYROXINE SODIUM 0.03 MG/1
25 TABLET ORAL DAILY
Qty: 90 TABLET | Refills: 3 | Status: SHIPPED | OUTPATIENT
Start: 2021-04-06 | End: 2022-04-05 | Stop reason: SDUPTHER

## 2021-04-30 ENCOUNTER — APPOINTMENT (OUTPATIENT)
Dept: ULTRASOUND IMAGING | Facility: HOSPITAL | Age: 41
End: 2021-04-30

## 2021-04-30 ENCOUNTER — HOSPITAL ENCOUNTER (EMERGENCY)
Facility: HOSPITAL | Age: 41
Discharge: HOME | End: 2021-04-30
Attending: EMERGENCY MEDICINE | Admitting: EMERGENCY MEDICINE

## 2021-04-30 ENCOUNTER — APPOINTMENT (OUTPATIENT)
Dept: GENERAL RADIOLOGY | Facility: HOSPITAL | Age: 41
End: 2021-04-30

## 2021-04-30 VITALS
SYSTOLIC BLOOD PRESSURE: 116 MMHG | HEART RATE: 64 BPM | BODY MASS INDEX: 35.85 KG/M2 | HEIGHT: 64 IN | TEMPERATURE: 98 F | RESPIRATION RATE: 16 BRPM | WEIGHT: 210 LBS | DIASTOLIC BLOOD PRESSURE: 90 MMHG | OXYGEN SATURATION: 98 %

## 2021-04-30 DIAGNOSIS — G89.29 CHRONIC PAIN OF LEFT KNEE: Primary | ICD-10-CM

## 2021-04-30 DIAGNOSIS — M25.562 CHRONIC PAIN OF LEFT KNEE: Primary | ICD-10-CM

## 2021-04-30 DIAGNOSIS — M54.50 LOW BACK PAIN, UNSPECIFIED BACK PAIN LATERALITY, UNSPECIFIED CHRONICITY, UNSPECIFIED WHETHER SCIATICA PRESENT: ICD-10-CM

## 2021-04-30 PROCEDURE — 93971 EXTREMITY STUDY: CPT

## 2021-04-30 PROCEDURE — 72100 X-RAY EXAM L-S SPINE 2/3 VWS: CPT

## 2021-04-30 PROCEDURE — 99282 EMERGENCY DEPT VISIT SF MDM: CPT

## 2021-04-30 PROCEDURE — 73562 X-RAY EXAM OF KNEE 3: CPT

## 2021-04-30 RX ORDER — SUCRALFATE 1 G/1
1 TABLET ORAL 4 TIMES DAILY
Qty: 40 TABLET | Refills: 0 | Status: SHIPPED | OUTPATIENT
Start: 2021-04-30 | End: 2023-04-04

## 2021-04-30 RX ORDER — PREDNISONE 20 MG/1
20 TABLET ORAL 2 TIMES DAILY
Qty: 10 TABLET | Refills: 0 | OUTPATIENT
Start: 2021-04-30 | End: 2021-11-28

## 2021-04-30 RX ORDER — LIDOCAINE 50 MG/G
1 PATCH TOPICAL EVERY 24 HOURS
Qty: 6 EACH | Refills: 0 | OUTPATIENT
Start: 2021-04-30 | End: 2021-11-28

## 2021-04-30 RX ORDER — KETOROLAC TROMETHAMINE 30 MG/ML
60 INJECTION, SOLUTION INTRAMUSCULAR; INTRAVENOUS ONCE
Status: DISCONTINUED | OUTPATIENT
Start: 2021-04-30 | End: 2021-04-30 | Stop reason: HOSPADM

## 2021-04-30 RX ORDER — TRAMADOL HYDROCHLORIDE 50 MG/1
50 TABLET ORAL EVERY 8 HOURS PRN
Qty: 10 TABLET | Refills: 0 | Status: SHIPPED | OUTPATIENT
Start: 2021-04-30 | End: 2023-04-04

## 2021-04-30 RX ORDER — MORPHINE SULFATE 4 MG/ML
4 INJECTION, SOLUTION INTRAMUSCULAR; INTRAVENOUS ONCE
Status: DISCONTINUED | OUTPATIENT
Start: 2021-04-30 | End: 2021-04-30 | Stop reason: HOSPADM

## 2021-04-30 RX ORDER — MELOXICAM 7.5 MG/1
7.5 TABLET ORAL DAILY
Qty: 14 TABLET | Refills: 0 | OUTPATIENT
Start: 2021-04-30 | End: 2021-11-28

## 2021-05-05 DIAGNOSIS — N20.0 KIDNEY STONE: Primary | ICD-10-CM

## 2021-05-12 ENCOUNTER — HOSPITAL ENCOUNTER (OUTPATIENT)
Dept: GENERAL RADIOLOGY | Facility: HOSPITAL | Age: 41
Discharge: HOME | End: 2021-05-12
Admitting: UROLOGY

## 2021-05-12 DIAGNOSIS — N20.0 KIDNEY STONE: ICD-10-CM

## 2021-05-12 PROCEDURE — 74018 RADEX ABDOMEN 1 VIEW: CPT

## 2021-05-13 ENCOUNTER — OFFICE VISIT (OUTPATIENT)
Dept: UROLOGY | Facility: CLINIC | Age: 41
End: 2021-05-13

## 2021-05-13 VITALS
TEMPERATURE: 98 F | RESPIRATION RATE: 16 BRPM | OXYGEN SATURATION: 98 % | HEART RATE: 75 BPM | BODY MASS INDEX: 35.85 KG/M2 | WEIGHT: 210 LBS | DIASTOLIC BLOOD PRESSURE: 82 MMHG | HEIGHT: 64 IN | SYSTOLIC BLOOD PRESSURE: 129 MMHG

## 2021-05-13 DIAGNOSIS — R39.9 LOWER URINARY TRACT SYMPTOMS (LUTS): ICD-10-CM

## 2021-05-13 PROCEDURE — 99213 OFFICE O/P EST LOW 20 MIN: CPT | Performed by: UROLOGY

## 2021-05-13 PROCEDURE — 51798 US URINE CAPACITY MEASURE: CPT | Performed by: UROLOGY

## 2021-05-13 NOTE — PROGRESS NOTES
Chief Complaint  Kidney Stone    HPI  Ms. Rizwan Johnson is a 41 y.o. female who presents for further management of nephrolithiasis.    She presents today for follow-up.  She is not having any flank pain or dysuria today.  She does complain of cyclical left abdominal pain and has seen her OB/GYN to discuss hysterectomy for a recently diagnosed fibroid.      Past Medical History  Past Medical History:   Diagnosis Date   • Abnormal Pap smear of cervix    • Disease of thyroid gland     Hashimotos thyroiditis    • Fibrocystic breast    • GERD (gastroesophageal reflux disease)    • Hashimoto's thyroiditis    • History of miscarriage    • Kidney stone    • Non-toxic multinodular goiter    • RA (rheumatoid arthritis) (CMS/HCC)    • Rheumatoid arthritis (CMS/HCC)    • Urethral stricture     age 5   • Uterine fibroid        Past Surgical History  Past Surgical History:   Procedure Laterality Date   • CERVICAL BIOPSY  W/ LOOP ELECTRODE EXCISION         Medications    Current Outpatient Medications:   •  albuterol (ACCUNEB) 0.63 MG/3ML nebulizer solution, , Disp: , Rfl:   •  albuterol sulfate  (90 Base) MCG/ACT inhaler, , Disp: , Rfl:   •  ALPRAZolam (XANAX) 0.25 MG tablet, Take 0.25 mg by mouth 2 (Two) Times a Day As Needed for Anxiety., Disp: , Rfl:   •  Cholecalciferol (VITAMIN D3) 125 MCG (5000 UT) capsule capsule, Take 5,000 Units by mouth Daily., Disp: , Rfl:   •  diclofenac (VOLTAREN) 1 % gel gel, Apply 4 g topically to the appropriate area as directed 4 (Four) Times a Day As Needed (as needed for pain)., Disp: 1 tube, Rfl: 0  •  Esomeprazole Magnesium (NEXIUM PO), Take  by mouth., Disp: , Rfl:   •  levonorgestrel (Mirena, 52 MG,) 20 MCG/24HR IUD, 1 each by Intrauterine route 1 (One) Time. Placed 7/17/2018 with Dr. Munoz, Disp: , Rfl:   •  levothyroxine (SYNTHROID, LEVOTHROID) 25 MCG tablet, Take 1 tablet by mouth Daily., Disp: 90 tablet, Rfl: 3  •  lidocaine (LIDODERM) 5 %, Place 1 patch on the skin as directed by  provider Daily. Remove & Discard patch within 12 hours or as directed by MD, Disp: 6 each, Rfl: 0  •  meloxicam (MOBIC) 7.5 MG tablet, Take 1 tablet by mouth Daily., Disp: 14 tablet, Rfl: 0  •  montelukast (SINGULAIR) 10 MG tablet, Take 10 mg by mouth Every Night., Disp: , Rfl:   •  predniSONE (DELTASONE) 20 MG tablet, Take 1 tablet by mouth 2 (Two) Times a Day., Disp: 10 tablet, Rfl: 0  •  sucralfate (CARAFATE) 1 g tablet, Take 1 tablet by mouth 4 (Four) Times a Day., Disp: 40 tablet, Rfl: 0  •  traMADol (ULTRAM) 50 MG tablet, Take 1 tablet by mouth Every 8 (Eight) Hours As Needed for Moderate Pain ., Disp: 10 tablet, Rfl: 0  •  vitamin C (ASCORBIC ACID) 500 MG tablet, Take 1,000 mg by mouth Daily., Disp: , Rfl:   •  XELJANZ XR 11 MG tablet sustained-release 24 hour, , Disp: , Rfl:     Allergies  Allergies   Allergen Reactions   • Arithmin [Antazoline] Hives   • Bactrim [Sulfamethoxazole-Trimethoprim] Hives   • Ceclor [Cefaclor] Hives   • Keflex [Cephalexin] Hives   • Penicillins Hives       Social History  Social History     Socioeconomic History   • Marital status:      Spouse name: Not on file   • Number of children: Not on file   • Years of education: Not on file   • Highest education level: Not on file   Tobacco Use   • Smoking status: Former Smoker   • Smokeless tobacco: Never Used   Vaping Use   • Vaping Use: Never used   Substance and Sexual Activity   • Alcohol use: Never   • Drug use: Never   • Sexual activity: Yes     Partners: Male     Birth control/protection: I.U.D.       Family History  Family History   Problem Relation Age of Onset   • Diabetes Father    • Stroke Father    • Coronary artery disease Father    • Thyroid disease Father    • Gout Father    • Kidney disease Father    • No Known Problems Mother    • Lupus Sister    • Gout Brother    • No Known Problems Son    • Breast cancer Neg Hx    • Ovarian cancer Neg Hx        Review of Systems  Constitutional: No fevers or chills  Skin:  "Negative for rash  Endocrine: No heat/cold intolerance   Cardiovascular: Negative for chest pain or dyspnea on exertion  Respiratory: Negative for shortness of breath or wheezing  Gastrointestinal: No constipation, nausea or vomiting  Genitourinary: No gross hematuria   Musculoskeletal: Negative for low back pain  Neurological:  Negative for frequent headaches or dizziness  Lymph/Heme: Negative for leg swelling or calf pain.    Physical Exam  Visit Vitals  /82   Pulse 75   Temp 98 °F (36.7 °C) (Temporal)   Resp 16   Ht 162.6 cm (64\")   Wt 95.3 kg (210 lb)   SpO2 98%   BMI 36.05 kg/m²     Constitutional: NAD, WDWN.   HEENT: NCAT. Conjunctivae normal.  MMM.  Endocrine: no clear thyromegaly    Cardiovascular: No apparent chest masses or complaints of palpitations.  Pulmonary/Chest: Respirations are even and non-labored bilaterally.  Neurological: A + O x 3.  Cranial Nerves II-XII grossly intact.  Extremities: JORDY x 4, Warm. No clubbing.  No cyanosis.    Skin: Pink, warm and dry.  No rashes noted.  Gastrointestinal: No apparent abdominal distention    Labs  Lab Results   Component Value Date    GLUCOSE 123 (H) 10/04/2018    BUN 8 10/04/2018    CREATININE 0.70 10/04/2018    EGFRIFNONA 93 10/04/2018    BCR 11.4 10/04/2018    K 3.6 10/04/2018    CO2 24.0 (L) 10/04/2018    CALCIUM 9.1 10/04/2018    ALBUMIN 4.30 10/04/2018    LABIL2 1.0 03/04/2014    AST 25 10/04/2018    ALT 27 10/04/2018       Lab Results   Component Value Date    WBC 8.27 10/04/2018    HGB 13.1 10/04/2018    HCT 41.7 10/04/2018    MCV 78.8 (L) 10/04/2018     10/04/2018       No results found for: LDH, URICACID    Lab Results   Component Value Date    CALCIUM 9.1 10/04/2018       Brief Urine Lab Results  (Last result in the past 365 days)      Color   Clarity   Blood   Leuk Est   Nitrite   Protein   CREAT   Urine HCG        01/10/21 1010 Yellow Cloudy Small Small (1+) Negative Trace               Radiologic Studies  XR Spine Lumbar 2 or 3 " View    Result Date: 4/30/2021  Impression: Advanced degenerative changes L5-S1 with associated grade 1 spondylolisthesis secondary to pars defects.  This report was finalized on 4/30/2021 10:20 AM by Aron Giordano MD.    XR Knee 3 View Left    Result Date: 4/30/2021  Impression: Degenerative changes.  No acute bony abnormality.    This report was finalized on 4/30/2021 10:14 AM by Aron Giordano MD.    US Venous Doppler Lower Extremity Left (duplex)    Result Date: 4/30/2021  Impression: No evidence of deep venous thrombosis of the left lower extremity.  This report was finalized on 4/30/2021 11:30 AM by Aron Giordano MD.    XR Abdomen KUB    Result Date: 5/12/2021  Impression: Minimal right nephrolithiasis.  This report was finalized on 5/12/2021 4:36 PM by Aron Giordano MD.    PVR  Post-void residual performed with ultrasound scanner by staff and interpreted by me - 38    Assessment  Ms. Rizwan Johnson is a 41 y.o. 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  Main stone risk factors are weight, family history, frequent urinary infections.  She has a duplicated kidney but I do not consider this a risk factor, as she has no hydronephrosis.    Her litholink demonstrated urine volume of 1.2L and increased urine sodium.  She does admit to eating a large amount of processed and preserved lunch meats. She has no stones or hydro.  She complains of cyclical left abdominal pain.  She does not have any stones in her left kidney.  She was recently found to have a fibroid.  She is emptying her bladder well today.  Her tiny right renal stone has not changed appreciably in size.    Plan  1. FU PRN    I spent a total of 20 minutes with the patient and the chart engaging in data gathering and interpretation, patient interaction, as well as counseling on the risks, benefits, and alternatives of the therapy and coordinating care.

## 2021-07-19 ENCOUNTER — LAB (OUTPATIENT)
Dept: LAB | Facility: HOSPITAL | Age: 41
End: 2021-07-19

## 2021-07-19 ENCOUNTER — TRANSCRIBE ORDERS (OUTPATIENT)
Dept: LAB | Facility: HOSPITAL | Age: 41
End: 2021-07-19

## 2021-07-19 DIAGNOSIS — R05.9 COUGH: ICD-10-CM

## 2021-07-19 DIAGNOSIS — M19.93 SECONDARY LOCALIZED OSTEOARTHROSIS: ICD-10-CM

## 2021-07-19 DIAGNOSIS — R53.83 TIREDNESS: ICD-10-CM

## 2021-07-19 DIAGNOSIS — M06.9 RHEUMATOID ARTHRITIS, INVOLVING UNSPECIFIED SITE, UNSPECIFIED WHETHER RHEUMATOID FACTOR PRESENT (HCC): ICD-10-CM

## 2021-07-19 DIAGNOSIS — M25.571 RIGHT ANKLE PAIN, UNSPECIFIED CHRONICITY: ICD-10-CM

## 2021-07-19 DIAGNOSIS — M06.9 RHEUMATOID ARTHRITIS, INVOLVING UNSPECIFIED SITE, UNSPECIFIED WHETHER RHEUMATOID FACTOR PRESENT (HCC): Primary | ICD-10-CM

## 2021-07-19 LAB
ALBUMIN SERPL-MCNC: 4 G/DL (ref 3.5–5.2)
ALBUMIN/GLOB SERPL: 1.3 G/DL
ALP SERPL-CCNC: 57 U/L (ref 39–117)
ALT SERPL W P-5'-P-CCNC: 13 U/L (ref 1–33)
ANION GAP SERPL CALCULATED.3IONS-SCNC: 9.5 MMOL/L (ref 5–15)
AST SERPL-CCNC: 16 U/L (ref 1–32)
BASOPHILS # BLD AUTO: 0.02 10*3/MM3 (ref 0–0.2)
BASOPHILS NFR BLD AUTO: 0.5 % (ref 0–1.5)
BILIRUB SERPL-MCNC: 0.5 MG/DL (ref 0–1.2)
BUN SERPL-MCNC: 9 MG/DL (ref 6–20)
BUN/CREAT SERPL: 13.4 (ref 7–25)
CALCIUM SPEC-SCNC: 8.9 MG/DL (ref 8.6–10.5)
CHLORIDE SERPL-SCNC: 107 MMOL/L (ref 98–107)
CO2 SERPL-SCNC: 21.5 MMOL/L (ref 22–29)
CREAT SERPL-MCNC: 0.67 MG/DL (ref 0.57–1)
CRP SERPL-MCNC: <0.3 MG/DL (ref 0–0.5)
DEPRECATED RDW RBC AUTO: 41 FL (ref 37–54)
EOSINOPHIL # BLD AUTO: 0.08 10*3/MM3 (ref 0–0.4)
EOSINOPHIL NFR BLD AUTO: 1.9 % (ref 0.3–6.2)
ERYTHROCYTE [DISTWIDTH] IN BLOOD BY AUTOMATED COUNT: 13.7 % (ref 12.3–15.4)
ERYTHROCYTE [SEDIMENTATION RATE] IN BLOOD: 27 MM/HR (ref 0–20)
GFR SERPL CREATININE-BSD FRML MDRD: 96 ML/MIN/1.73
GLOBULIN UR ELPH-MCNC: 3.2 GM/DL
GLUCOSE SERPL-MCNC: 77 MG/DL (ref 65–99)
HAV IGM SERPL QL IA: NORMAL
HBV CORE IGM SERPL QL IA: NORMAL
HBV SURFACE AG SERPL QL IA: NORMAL
HCT VFR BLD AUTO: 44.7 % (ref 34–46.6)
HCV AB SER DONR QL: NORMAL
HGB BLD-MCNC: 14 G/DL (ref 12–15.9)
LYMPHOCYTES # BLD AUTO: 1.54 10*3/MM3 (ref 0.7–3.1)
LYMPHOCYTES NFR BLD AUTO: 37.1 % (ref 19.6–45.3)
MCH RBC QN AUTO: 25.9 PG (ref 26.6–33)
MCHC RBC AUTO-ENTMCNC: 31.3 G/DL (ref 31.5–35.7)
MCV RBC AUTO: 82.8 FL (ref 79–97)
MONOCYTES # BLD AUTO: 0.36 10*3/MM3 (ref 0.1–0.9)
MONOCYTES NFR BLD AUTO: 8.7 % (ref 5–12)
NEUTROPHILS NFR BLD AUTO: 2.14 10*3/MM3 (ref 1.7–7)
NEUTROPHILS NFR BLD AUTO: 51.6 % (ref 42.7–76)
PLATELET # BLD AUTO: 258 10*3/MM3 (ref 140–450)
PMV BLD AUTO: 13.6 FL (ref 6–12)
POTASSIUM SERPL-SCNC: 4.6 MMOL/L (ref 3.5–5.2)
PROT SERPL-MCNC: 7.2 G/DL (ref 6–8.5)
RBC # BLD AUTO: 5.4 10*6/MM3 (ref 3.77–5.28)
SODIUM SERPL-SCNC: 138 MMOL/L (ref 136–145)
WBC # BLD AUTO: 4.15 10*3/MM3 (ref 3.4–10.8)

## 2021-07-19 PROCEDURE — 85652 RBC SED RATE AUTOMATED: CPT

## 2021-07-19 PROCEDURE — 80053 COMPREHEN METABOLIC PANEL: CPT

## 2021-07-19 PROCEDURE — 80074 ACUTE HEPATITIS PANEL: CPT

## 2021-07-19 PROCEDURE — 85025 COMPLETE CBC W/AUTO DIFF WBC: CPT

## 2021-07-19 PROCEDURE — 86140 C-REACTIVE PROTEIN: CPT

## 2021-07-19 PROCEDURE — 36415 COLL VENOUS BLD VENIPUNCTURE: CPT

## 2021-07-19 PROCEDURE — 86480 TB TEST CELL IMMUN MEASURE: CPT

## 2021-07-22 LAB
GAMMA INTERFERON BACKGROUND BLD IA-ACNC: 0 IU/ML
M TB IFN-G BLD-IMP: NEGATIVE
M TB IFN-G CD4+ BCKGRND COR BLD-ACNC: 0.02 IU/ML
M TB IFN-G CD4+CD8+ BCKGRND COR BLD-ACNC: 0.01 IU/ML
MITOGEN IGNF BLD-ACNC: >10 IU/ML
SERVICE CMNT-IMP: NORMAL

## 2021-11-28 PROCEDURE — U0004 COV-19 TEST NON-CDC HGH THRU: HCPCS | Performed by: PHYSICIAN ASSISTANT

## 2022-02-28 ENCOUNTER — TRANSCRIBE ORDERS (OUTPATIENT)
Dept: ADMINISTRATIVE | Facility: HOSPITAL | Age: 42
End: 2022-02-28

## 2022-02-28 DIAGNOSIS — Z12.31 SCREENING MAMMOGRAM FOR BREAST CANCER: Primary | ICD-10-CM

## 2022-03-30 ENCOUNTER — OFFICE VISIT (OUTPATIENT)
Dept: OBSTETRICS AND GYNECOLOGY | Facility: CLINIC | Age: 42
End: 2022-03-30

## 2022-03-30 VITALS
HEIGHT: 64 IN | SYSTOLIC BLOOD PRESSURE: 104 MMHG | BODY MASS INDEX: 35.27 KG/M2 | DIASTOLIC BLOOD PRESSURE: 70 MMHG | WEIGHT: 206.6 LBS

## 2022-03-30 DIAGNOSIS — Z87.42 HISTORY OF ABNORMAL CERVICAL PAP SMEAR: ICD-10-CM

## 2022-03-30 DIAGNOSIS — Z01.419 ENCOUNTER FOR ANNUAL ROUTINE GYNECOLOGICAL EXAMINATION: Primary | ICD-10-CM

## 2022-03-30 DIAGNOSIS — Z30.431 IUD CHECK UP: ICD-10-CM

## 2022-03-30 PROCEDURE — 99396 PREV VISIT EST AGE 40-64: CPT | Performed by: OBSTETRICS & GYNECOLOGY

## 2022-03-30 NOTE — PROGRESS NOTES
"GYN Annual Exam     CC - Here for annual exam.     Subjective   HPI  Ric Johnson is a 42 y.o. female, , who presents for annual well woman exam.  Patient's last menstrual period was 2022 (within days).  Periods are rare and irregular, lasting 1 day, and described as spotting.  Dysmenorrhea: mild, occurring premenstrually.  Patient reports problems with: occasional hot flashes (present for the past month) and premenstrual headaches (lasting 1-2 days).  Partner Status: Marital Status: .  New Partners since last visit:  no.  Desires STD Screening:  no.    Additional OB/GYN History   Current contraception: Mirena IUD  Last Pap :   Last Completed Pap Smear          Ordered - PAP SMEAR (Every 3 Years) Ordered on 3/30/2022    2020  Pap IG, HPV-hr    2019  Done - in [place]              History of abnormal Pap smear: yes  Family history of uterine, colon, breast, or ovarian cancer: yes  Performs monthly Self-Breast Exam: \"sometimes\"  Last mammogram:   Last Completed Mammogram     This patient has no relevant Health Maintenance data.        Feelings of Anxiety or Depression: controlled  Tobacco Usage?: No   OB History        4    Para   1    Term   1       0    AB   3    Living   1       SAB   3    IAB   0    Ectopic   0    Molar   0    Multiple   0    Live Births   1                Health Maintenance   Topic Date Due   • Annual Gynecologic Pelvic and Breast Exam  Never done   • COVID-19 Vaccine (1) Never done   • Pneumococcal Vaccine 0-64 (1 of 2 - PPSV23) Never done   • INFLUENZA VACCINE  Never done   • ANNUAL PHYSICAL  2021   • MAMMOGRAM  2022   • PAP SMEAR  2023   • TDAP/TD VACCINES (2 - Tdap) 2030   • HEPATITIS C SCREENING  Completed         Current Outpatient Medications:   •  albuterol (ACCUNEB) 0.63 MG/3ML nebulizer solution, , Disp: , Rfl:   •  albuterol sulfate  (90 Base) MCG/ACT inhaler, , Disp: , Rfl:   •  ALPRAZolam (XANAX) " "0.25 MG tablet, Take 0.25 mg by mouth 2 (Two) Times a Day As Needed for Anxiety., Disp: , Rfl:   •  Cholecalciferol (VITAMIN D3) 125 MCG (5000 UT) capsule capsule, Take 5,000 Units by mouth Daily., Disp: , Rfl:   •  Esomeprazole Magnesium (NEXIUM PO), Take  by mouth., Disp: , Rfl:   •  levonorgestrel (Mirena, 52 MG,) 20 MCG/24HR IUD, 1 each by Intrauterine route 1 (One) Time. Placed 7/17/2018 with Dr. Munoz, Disp: , Rfl:   •  levothyroxine (SYNTHROID, LEVOTHROID) 25 MCG tablet, Take 1 tablet by mouth Daily., Disp: 90 tablet, Rfl: 3  •  montelukast (SINGULAIR) 10 MG tablet, Take 10 mg by mouth Every Night., Disp: , Rfl:   •  vitamin C (ASCORBIC ACID) 500 MG tablet, Take 1,000 mg by mouth Daily., Disp: , Rfl:   •  XELJANZ XR 11 MG tablet sustained-release 24 hour, , Disp: , Rfl:   •  sucralfate (CARAFATE) 1 g tablet, Take 1 tablet by mouth 4 (Four) Times a Day., Disp: 40 tablet, Rfl: 0  •  traMADol (ULTRAM) 50 MG tablet, Take 1 tablet by mouth Every 8 (Eight) Hours As Needed for Moderate Pain ., Disp: 10 tablet, Rfl: 0    The additional following portions of the patient's history were reviewed and updated as appropriate: allergies, current medications, past family history, past medical history, past social history, past surgical history and problem list.    Review of Systems   Constitutional: Negative.    HENT: Negative.    Eyes: Negative.    Respiratory: Negative.    Cardiovascular: Negative.    Gastrointestinal: Negative.    Endocrine: Heat intolerance: hot flashes.   Genitourinary: Negative.    Musculoskeletal: Negative.    Skin: Negative.    Allergic/Immunologic: Negative.    Neurological: Headache: premenstrual.   Hematological: Negative.    Psychiatric/Behavioral: Negative.        I have reviewed and agree with the HPI, ROS, and historical information as entered above. Han Munoz MD    Objective   /70 (BP Location: Right arm, Patient Position: Sitting, Cuff Size: Large Adult)   Ht 162.6 cm (64\")  "  Wt 93.7 kg (206 lb 9.6 oz)   LMP 02/28/2022 (Within Days) Comment: Mirena in place  Breastfeeding No   BMI 35.46 kg/m²     PE    Breast: Without masses ,nontender, no skin changes or retractions  Axilla: Normal, no lymphadenopathy  Heart: Regular rate no murmurs rubs or gallops  Lungs: Clear to auscultation, normal breath sounds bilaterally  Abdomen: Soft nontender, no hepatosplenomegaly, no guarding or rebound, no masses  Pelvic exam  External genitalia: Normal introitus and vulva  Vagina: Normal mucosa no bleeding inflammation or discharge  Bladder: Normal position nontender  Urethral meatus and urethra: Normal nontender  Cervix: No lesions, no discharge, bleeding or inflammation  Bimanual: Nontender adnexa clear, no sign of uterine or ovarian enlargement       Assessment/Plan     Assessment     Problem List Items Addressed This Visit        Genitourinary and Reproductive     IUD check up    Encounter for annual routine gynecological examination - Primary    Relevant Orders    IGP, Rfx Aptima HPV ASCU    History of abnormal cervical Pap smear          1. GYN annual well woman exam.   2. Preventative health information reviewed    Plan     1. Next pap due in: 2 years  2. Reviewed HPV guidelines  3. Anticipatory menopausal guidance given.        Han Munoz MD  03/30/2022

## 2022-04-04 ENCOUNTER — LAB (OUTPATIENT)
Dept: LAB | Facility: HOSPITAL | Age: 42
End: 2022-04-04

## 2022-04-04 ENCOUNTER — OFFICE VISIT (OUTPATIENT)
Dept: ENDOCRINOLOGY | Facility: CLINIC | Age: 42
End: 2022-04-04

## 2022-04-04 VITALS
HEIGHT: 64 IN | BODY MASS INDEX: 35 KG/M2 | SYSTOLIC BLOOD PRESSURE: 116 MMHG | DIASTOLIC BLOOD PRESSURE: 70 MMHG | WEIGHT: 205 LBS | OXYGEN SATURATION: 96 % | HEART RATE: 63 BPM

## 2022-04-04 DIAGNOSIS — E06.3 HASHIMOTO'S THYROIDITIS: Primary | ICD-10-CM

## 2022-04-04 DIAGNOSIS — E06.3 HASHIMOTO'S THYROIDITIS: ICD-10-CM

## 2022-04-04 PROBLEM — E04.1 SOLITARY THYROID NODULE: Status: RESOLVED | Noted: 2021-04-05 | Resolved: 2022-04-04

## 2022-04-04 LAB — TSH SERPL DL<=0.05 MIU/L-ACNC: 0.83 UIU/ML (ref 0.27–4.2)

## 2022-04-04 PROCEDURE — 99213 OFFICE O/P EST LOW 20 MIN: CPT | Performed by: INTERNAL MEDICINE

## 2022-04-04 PROCEDURE — 84443 ASSAY THYROID STIM HORMONE: CPT

## 2022-04-04 NOTE — PROGRESS NOTES
"     Office Note      Date: 2022  Patient Name: Ric Johnson  MRN: [id number]  : 1980    Chief Complaint   Patient presents with   • Hashimoto's Thyroiditis       History of Present Illness:   Ric Johnson is a 42 y.o. female who presents for Hashimoto's Thyroiditis  HERE FOR ANNUAL FOLLOW UP.  SHE IS ON T4 FOR SUPPRESSION.  SHE HAD FLU AND COVID IN January AND WAS SICK FOR 2 WEEKS.  ---------------------------  SHE WAS NO HOSPITALIZED.  ---------------------------      Subjective          Review of Systems:   Review of Systems   Constitutional: Negative for unexpected weight change.   HENT: Positive for trouble swallowing.    Eyes: Negative for visual disturbance.   Cardiovascular: Negative for palpitations.   Endocrine: Positive for cold intolerance and heat intolerance.   Psychiatric/Behavioral: Positive for sleep disturbance.       The following portions of the patient's history were reviewed and updated as appropriate: allergies, current medications, past family history, past medical history, past social history, past surgical history and problem list.    Objective     Visit Vitals  /70 (BP Location: Left arm, Patient Position: Sitting, Cuff Size: Adult)   Pulse 63   Ht 162.6 cm (64\")   Wt 93 kg (205 lb)   SpO2 96%   BMI 35.19 kg/m²       Labs:    CBC w/DIFF  Lab Results   Component Value Date    WBC 4.15 2021    RBC 5.40 (H) 2021    HGB 14.0 2021    HCT 44.7 2021    MCV 82.8 2021    MCH 25.9 (L) 2021    MCHC 31.3 (L) 2021    RDW 13.7 2021    RDWSD 41.0 2021    MPV 13.6 (H) 2021     2021    NEUTRORELPCT 51.6 2021    LYMPHORELPCT 37.1 2021    MONORELPCT 8.7 2021    EOSRELPCT 1.9 2021    BASORELPCT 0.5 2021    AUTOIGPER 0.4 10/04/2018    NEUTROABS 2.14 2021    LYMPHSABS 1.54 2021    MONOSABS 0.36 2021    EOSABS 0.08 2021    BASOSABS 0.02 2021    " AUTOIGNUM 0.03 10/04/2018    NRBC 0.0 10/04/2018       T4  No results found for: FREET4    TSH  No results found for: TSHBASE     Physical Exam:  Physical Exam  Vitals reviewed.   Constitutional:       Appearance: Normal appearance.   Eyes:      Extraocular Movements: Extraocular movements intact.   Neck:      Comments: INDURATED GLAND  Lymphadenopathy:      Cervical: No cervical adenopathy.   Neurological:      Mental Status: She is alert.   Psychiatric:         Mood and Affect: Mood normal.         Thought Content: Thought content normal.         Judgment: Judgment normal.         Assessment / Plan      Assessment & Plan:  Problem List Items Addressed This Visit        Other    Hashimoto's thyroiditis - Primary    Overview     + serologies with preserved thyroid function  On low dose t4 to suppress inflammation            Current Assessment & Plan     Clinically euthyroid. Thyroid levels ordered. Medication to be adjusted accordingly.           Relevant Medications    XELJANZ XR 11 MG tablet sustained-release 24 hour    levothyroxine (SYNTHROID, LEVOTHROID) 25 MCG tablet           Herb Rodriguez MD   04/04/2022

## 2022-04-05 RX ORDER — LEVOTHYROXINE SODIUM 0.03 MG/1
25 TABLET ORAL DAILY
Qty: 90 TABLET | Refills: 3 | Status: SHIPPED | OUTPATIENT
Start: 2022-04-05 | End: 2023-04-05 | Stop reason: SDUPTHER

## 2022-04-14 DIAGNOSIS — Z01.419 ENCOUNTER FOR ANNUAL ROUTINE GYNECOLOGICAL EXAMINATION: ICD-10-CM

## 2022-04-29 ENCOUNTER — HOSPITAL ENCOUNTER (OUTPATIENT)
Dept: MAMMOGRAPHY | Facility: HOSPITAL | Age: 42
Discharge: HOME | End: 2022-04-29
Admitting: OBSTETRICS & GYNECOLOGY

## 2022-04-29 DIAGNOSIS — Z12.31 SCREENING MAMMOGRAM FOR BREAST CANCER: ICD-10-CM

## 2022-04-29 PROCEDURE — 77067 SCR MAMMO BI INCL CAD: CPT

## 2022-04-29 PROCEDURE — 77067 SCR MAMMO BI INCL CAD: CPT | Performed by: RADIOLOGY

## 2022-04-29 PROCEDURE — 77063 BREAST TOMOSYNTHESIS BI: CPT

## 2022-04-29 PROCEDURE — 77063 BREAST TOMOSYNTHESIS BI: CPT | Performed by: RADIOLOGY

## 2023-04-04 ENCOUNTER — OFFICE VISIT (OUTPATIENT)
Dept: ENDOCRINOLOGY | Facility: CLINIC | Age: 43
End: 2023-04-04
Payer: COMMERCIAL

## 2023-04-04 VITALS
HEIGHT: 64 IN | SYSTOLIC BLOOD PRESSURE: 120 MMHG | WEIGHT: 205 LBS | DIASTOLIC BLOOD PRESSURE: 84 MMHG | OXYGEN SATURATION: 98 % | HEART RATE: 69 BPM | BODY MASS INDEX: 35 KG/M2

## 2023-04-04 DIAGNOSIS — E06.3 HASHIMOTO'S THYROIDITIS: Primary | ICD-10-CM

## 2023-04-04 LAB — TSH SERPL DL<=0.05 MIU/L-ACNC: 0.56 UIU/ML (ref 0.27–4.2)

## 2023-04-04 PROCEDURE — 84443 ASSAY THYROID STIM HORMONE: CPT | Performed by: INTERNAL MEDICINE

## 2023-04-04 PROCEDURE — 99213 OFFICE O/P EST LOW 20 MIN: CPT | Performed by: INTERNAL MEDICINE

## 2023-04-04 NOTE — PROGRESS NOTES
"     Office Note      Date: 2023  Patient Name: Ric Johnson  MRN: [id number]  : 1980    Chief Complaint   Patient presents with   • Hashimoto's Thyroiditis       History of Present Illness:   Ric Johnson is a 43 y.o. female who presents for Hashimoto's Thyroiditis  .   Current rx: T4- 25 mcg per day     Changes in history: none   Questions /problems: none     Subjective          Review of Systems:   Review of Systems   Constitutional: Positive for fatigue. Negative for unexpected weight change.   HENT: Negative for trouble swallowing and voice change.    Eyes: Negative for visual disturbance.   Endocrine: Positive for heat intolerance.       The following portions of the patient's history were reviewed and updated as appropriate: allergies, current medications, past family history, past medical history, past social history, past surgical history and problem list.    Objective     Visit Vitals  /84   Pulse 69   Ht 162.6 cm (64\")   Wt 93 kg (205 lb)   SpO2 98%   BMI 35.19 kg/m²           Physical Exam:  Physical Exam  Vitals reviewed.   Constitutional:       Appearance: Normal appearance.   Eyes:      Extraocular Movements: Extraocular movements intact.   Neck:      Comments: Hard hashimoto's gland  Lymphadenopathy:      Cervical: No cervical adenopathy.   Neurological:      Mental Status: She is alert.   Psychiatric:         Mood and Affect: Mood normal.         Thought Content: Thought content normal.         Assessment / Plan      Assessment & Plan:  Problem List Items Addressed This Visit        Other    Hashimoto's thyroiditis - Primary    Overview     + serologies with preserved thyroid function  On low dose t4 to suppress inflammation          Current Assessment & Plan     Stable  Clinically euthyroid. Thyroid levels ordered. Medication to be adjusted accordingly.         Relevant Medications    XELJANZ XR 11 MG tablet sustained-release 24 hour    levothyroxine (SYNTHROID, " LEVOTHROID) 25 MCG tablet    Other Relevant Orders    TSH        Herb Rodriguez MD   04/04/2023

## 2023-04-05 RX ORDER — LEVOTHYROXINE SODIUM 0.03 MG/1
25 TABLET ORAL DAILY
Qty: 90 TABLET | Refills: 3 | Status: SHIPPED | OUTPATIENT
Start: 2023-04-05

## 2023-06-05 ENCOUNTER — HOSPITAL ENCOUNTER (OUTPATIENT)
Dept: GENERAL RADIOLOGY | Facility: HOSPITAL | Age: 43
Discharge: HOME | End: 2023-06-05
Admitting: NURSE PRACTITIONER
Payer: COMMERCIAL

## 2023-06-05 ENCOUNTER — TRANSCRIBE ORDERS (OUTPATIENT)
Dept: GENERAL RADIOLOGY | Facility: HOSPITAL | Age: 43
End: 2023-06-05
Payer: COMMERCIAL

## 2023-06-05 DIAGNOSIS — M25.571 PAIN IN RIGHT ANKLE AND JOINTS OF RIGHT FOOT: Primary | ICD-10-CM

## 2023-06-05 DIAGNOSIS — M25.571 PAIN IN RIGHT ANKLE AND JOINTS OF RIGHT FOOT: ICD-10-CM

## 2023-06-05 PROCEDURE — 73610 X-RAY EXAM OF ANKLE: CPT

## 2023-06-05 PROCEDURE — 73630 X-RAY EXAM OF FOOT: CPT

## 2023-09-22 ENCOUNTER — OFFICE VISIT (OUTPATIENT)
Dept: ORTHOPEDIC SURGERY | Facility: CLINIC | Age: 43
End: 2023-09-22
Payer: COMMERCIAL

## 2023-09-22 VITALS
SYSTOLIC BLOOD PRESSURE: 124 MMHG | DIASTOLIC BLOOD PRESSURE: 86 MMHG | BODY MASS INDEX: 33.46 KG/M2 | HEIGHT: 64 IN | WEIGHT: 196 LBS

## 2023-09-22 DIAGNOSIS — M19.079 ANKLE ARTHRITIS: Primary | ICD-10-CM

## 2023-09-22 RX ORDER — MEDROXYPROGESTERONE ACETATE 150 MG/ML
INJECTION, SUSPENSION INTRAMUSCULAR
COMMUNITY
Start: 2023-09-19

## 2023-09-22 NOTE — PATIENT INSTRUCTIONS
"Ankle Brace  Go to amazon.com and type in \"Med Spec ASO Ankle Stabilizer\"         -Stabilizing Straps form complete figure-eight to protect and support ankle   -Ballistic nylon boot provides superior durability and strength   -Elastic cuff closure enhances support and keeps laces and stabilizing straps secure   -Bilateral design so each size will fit left or right foot  -Low profile to fit in any type of shoe     Ankle Arthritis: Non-Operative Treatment    There are a variety of non-operative treatments that may prove helpful in managing ankle arthritis. A combination of these treatment strategies often works best. Each patient will be the best  of which treatments work and one often has to try a variety of combinations to see what works best.    Each treatment is designed to do one or more of the following:   Limit the force going through the ankle joint;   Limit the movement through the ankle joint   Minimize the pain response.    Non-operative treatments may include:  Shoes: A stiff sole shoe with a rocker-bottom contour can absorb shock on impact and help with weight transfer during walking.  Ankle Braces: These can range from a lace-up brace to a medical walking boot to a custom brace. All are designed to limit ankle motion.  Cushioned shock absorbing orthotic: A soft shock-absorbing over-the-counter orthotic or heel cushion like a visco heel (silicone) or memory foam insert may help to absorb some of the force that would normally go through the ankle with each step and helps spread out stress from ankle during walking.  Cane: Used in the opposite hand (cane goes in the left hand if your right ankle has arthritis) a cane will decrease the force that is felt through the ankle joint with walking. It also improves balance which can make walking easier  Weight Loss: Each 5 lbs of weight loss lessens ankle joint stress by 10-15 lbs/step.  Physical Therapy or Home exercise programs: Exercises designed to keep " the joints from getting stiff and keeping muscles that control the ankle strong can be helpful. Therapists can also often suggest ways to perform activities that are less stressful to the ankle.  Activity modification: Find creative ways to lessen standing/steps or exercise triggers which cause more joint pain.  Anti-Inflammatory Medication (NSAIDs): Ibuprofen, Naprosyn, and others, both over the counter and by prescription, may be helpful. Used on cyclic basis NSAIDs can help joint pain and includes oral and topical NSAID agents. (Discuss with your physician before starting)  Acetaminophen (ex. Tylenol): Can also provide pain relief, but patients with liver problems should avoid this medication and it is important to take only the amount instructed.  Corticosteroid Injections: Reduces the inflammation/pain ankle arthritis. Can be effective for 3-6 months.    The information contained in this handout is copyrighted by [email or web address] This handout may be reproduced in small quantities for non-commercial educational use  Edited by Daniel Lewis MD and Lawson Nichols MD

## 2023-09-22 NOTE — PROGRESS NOTES
[facility] [place] [facility] Office Visit     Office Visit       Date: 09/22/2023   Patient Name: Ric Johnson  MRN: [id number] with right ankle pain.  YOB: 1980    Referring Physician: No ref. provider found     Chief Complaint:   Chief Complaint   Patient presents with    Right Ankle - Pain       History of Present Illness: Ric Johnson is a 43 y.o. female who is here today with right ankle pain.  Patient with history of rheumatoid arthritis.  Also states has a remote history of injuring that ankle in the past.  Describes pain as aching burning shooting and also has swelling.  States pain became worse May 2023.  No previous surgery.  Worse with activity.  Does wear a brace as well as Hoka shoes which help with her symptoms.  Has tried Motrin with some relief.  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  Patient states does occasionally smoke as a way of relieving stress.  Currently seeing rheumatology and being treated with Xeljanz for symptoms of RA however it has been less effective as of lately.  Currently transitioning to new medication and is on oral prednisone at the moment however this is planned to be temporary.  Patient did receive a steroid injection in her ankle in the past which helped however had a more recent injection that she states did not help at all.  Has also had previous steroid injections in her knee which also helped.    Subjective   Review of Systems: Review of Systems   Constitutional: Negative.  Negative for chills, fatigue and fever.   HENT: Negative.  Negative for congestion and dental problem.    Eyes: Negative.  Negative for blurred vision.   Respiratory: Negative.  Negative for shortness of breath.    Cardiovascular: Negative.  Negative for leg swelling.   Gastrointestinal: Negative.  Negative for abdominal pain.   Endocrine: Negative.  Negative for polyuria.   Genitourinary: Negative.   Negative for difficulty urinating.   Musculoskeletal:  Positive for arthralgias.   Skin: Negative.    Allergic/Immunologic: Negative.    Neurological: Negative.    Hematological: Negative.  Negative for adenopathy.   Psychiatric/Behavioral: Negative.  Negative for behavioral problems.       Past Medical History:   Past Medical History:   Diagnosis Date    Anxiety     Asthma     Fibrocystic breast     GERD (gastroesophageal reflux disease)     Hashimoto's thyroiditis     History of abnormal cervical Pap smear     : LSIL, HPV+    History of kidney stones     History of miscarriage     IUD (intrauterine device) in place     Mirena inserted 2018    Non-toxic multinodular goiter     Osteoarthritis     Ovarian cyst     PMS (premenstrual syndrome)     Rheumatoid arthritis     Urethral stricture     age 5    Uterine fibroid     Uterine prolapse 2016       Past Surgical History:   Past Surgical History:   Procedure Laterality Date    COLPOSCOPY W/ BIOPSY / CURETTAGE  10/13/2008    HSIL, HR HPV+    LEEP  2008    carcinoma in situ       Family History:   Family History   Problem Relation Age of Onset    Diabetes type II Father     Stroke Father     Coronary artery disease Father     Thyroid disease Father     Gout Father     Kidney disease Father     No Known Problems Mother     Gout Brother     Diabetes type II Brother     Lupus Sister     No Known Problems Son     Diabetes Paternal Grandmother     Heart disease Paternal Grandmother     Breast cancer Paternal Cousin         2nd cousin    Ovarian cancer Neg Hx     Colon cancer Neg Hx        Social History:   Social History     Socioeconomic History    Marital status:    Tobacco Use    Smoking status: Former     Packs/day: 0.50     Years: 5.00     Pack years: 2.50     Types: Cigarettes     Quit date:      Years since quittin.7    Smokeless tobacco: Never   Vaping Use    Vaping Use: Never used   Substance and Sexual Activity    Alcohol use: Not  "Currently     Comment: 0-1x monthly    Drug use: Never    Sexual activity: Yes     Partners: Male     Birth control/protection: I.U.D.       Medications:   Current Outpatient Medications:     albuterol sulfate  (90 Base) MCG/ACT inhaler, Inhale 2 puffs Every 4 (Four) Hours As Needed for Wheezing., Disp: 18 g, Rfl: 0    ALPRAZolam (XANAX) 0.25 MG tablet, Take 1 tablet by mouth 2 (Two) Times a Day As Needed for Anxiety., Disp: , Rfl:     Cholecalciferol (VITAMIN D3) 125 MCG (5000 UT) capsule capsule, Take 1 capsule by mouth Daily., Disp: , Rfl:     Enbrel SureClick 50 MG/ML solution auto-injector, , Disp: , Rfl:     Esomeprazole Magnesium (NEXIUM PO), Take 20 mg by mouth 2 (Two) Times a Day., Disp: , Rfl:     levonorgestrel (Mirena, 52 MG,) 20 MCG/24HR IUD, 1 each by Intrauterine route 1 (One) Time. Placed 7/17/2018 with Dr. Munoz, Disp: , Rfl:     levothyroxine (SYNTHROID, LEVOTHROID) 25 MCG tablet, Take 1 tablet by mouth Daily., Disp: 90 tablet, Rfl: 3    montelukast (SINGULAIR) 10 MG tablet, Take 1 tablet by mouth Every Night., Disp: , Rfl:     vitamin C (ASCORBIC ACID) 500 MG tablet, Take 2 tablets by mouth Daily., Disp: , Rfl:     Allergies:   Allergies   Allergen Reactions    Azithromycin Hives    Bactrim [Sulfamethoxazole-Trimethoprim] Hives    Ceclor [Cefaclor] Hives    Keflex [Cephalexin] Hives    Penicillins Hives    Tamiflu [Oseltamivir Phosphate] Rash    Zofran [Ondansetron Hcl] Rash       I reviewed the patient's chief complaint, history of present illness, review of systems, past medical history, surgical history, family history, social history, medications and allergy list.     Objective    Vital Signs:   Vitals:    09/22/23 0801   BP: 124/86   Weight: 88.9 kg (196 lb)   Height: 162.6 cm (64.02\")     Body mass index is 33.63 kg/m².    Ortho Exam:  right LE Foot and Ankle Exam:   Hindfoot alignment is neutral. Plantigrade foot.   Neurological exam of the superficial peroneal, deep peroneal, " plantar, sural and saphenous nerves demonstrates intact sensation and normal motor function.  There is good perfusion to the toes.   The skin is intact throughout the foot and ankle without ulceration.   Range of motion of subtalar joint, midfoot and toes is within normal limits.   There is tenderness to palpation about tibiotalar joint line, pain with passive ROM of ankle, limited passive/active ankle ROM.      Results Review:   PROCEDURE: XR ANKLE 3+ VW RIGHT     HISTORY: RIGHT FOOT AND ANKLE PAIN; M25.571-Pain in right ankle and  joints of right foot     COMPARISON: None.     FINDINGS:  A 3 view exam demonstrates no acute fracture or dislocation.   There are moderate to severe degenerative changes.  No soft tissue  abnormality is seen.     IMPRESSION:  No acute fracture. Degenerative changes.    09/22/23 I have personally reviewed and interpreted the images from outside facility with the documented findings, advanced arthritis about the tibiotalar joint      Assessment / Plan    Assessment/Plan:   Diagnoses and all orders for this visit:    1. Ankle arthritis (Primary)  -     Ambulatory Referral For Orthotics      Discussed right ankle pain pain undiagnosed new problem with uncertain prognosis) with patient as well as treatment options at length. Decision regarding surgical intervention considered. Patient is not a candidate due to trial of nonoperative management. Also reviewed external note and imaging studies from June 5. I explained this is arthritis of the ankle joint. It typically occurs in the ankle after a history of trauma however in her case is likely related to her RA. I explained that arthritis by definition is a loss of cartilage. We do not make cartilage in our bodies as adults, once it begins to wear out it will continue to wear out. As this happens, the body puts extra bone around the joint.  We call this osteophyte formation.    We discussed treatment options at length:  1. Bracing and shoewear  "modification  2. NSAIDs (OTC Aleve, Advil, etc) when painful I also recommended voltaren gel   3. Cortisone injection - the injection can be done every 3 months    Surgery is reserved for failure of conservative treatment. There are 2 surgeries that I  generally recommend, replacement versus fusion. They are both \"salvage\" procedures, as we cannot make new cartilage. Not everyone is a candidate for these procedures and there are pros and cons to both.     Patient is not interested in surgery at this time.  Patient does see some benefit from her lace up ankle brace as well as wearing Hoka shoes.  Her last steroid injection was not successful however I speculate it may have not been placed in the correct location.  [facility] [place] [facility] and discussed with patient she can return at any time for steroid injections.  Did  patient on the harmful effects of smoking and if she ever did want to pursue surgery in the future she would need to quit prior to doing that.  Patient expressed understanding.  It was a pleasure seeing her today.    Patient suffers from pain and foot/ankle instability.  I am ordering custom ankle foot orthoses to stabilize and reduce pain.  Custom required for tri-plane control, deformity, lifetime need.     Follow Up:   Return if symptoms worsen or fail to improve.      Arsenio Power MD  [facility] [place] [facility]  "

## 2024-04-05 ENCOUNTER — OFFICE VISIT (OUTPATIENT)
Dept: ENDOCRINOLOGY | Facility: CLINIC | Age: 44
End: 2024-04-05
Payer: COMMERCIAL

## 2024-04-05 VITALS
HEART RATE: 86 BPM | WEIGHT: 211 LBS | DIASTOLIC BLOOD PRESSURE: 74 MMHG | SYSTOLIC BLOOD PRESSURE: 120 MMHG | OXYGEN SATURATION: 97 % | BODY MASS INDEX: 36.02 KG/M2 | HEIGHT: 64 IN

## 2024-04-05 DIAGNOSIS — E06.3 HASHIMOTO'S THYROIDITIS: Primary | ICD-10-CM

## 2024-04-05 LAB — TSH SERPL DL<=0.05 MIU/L-ACNC: 0.47 UIU/ML (ref 0.27–4.2)

## 2024-04-05 PROCEDURE — 84443 ASSAY THYROID STIM HORMONE: CPT | Performed by: INTERNAL MEDICINE

## 2024-04-05 NOTE — PROGRESS NOTES
"     Office Note      Date: 2024  Patient Name: Ric Johnson  MRN: [id number]  : 1980    Chief Complaint   Patient presents with    Hashimoto's Thyroiditis       History of Present Illness:   Ric Johnson is a 44 y.o. female who presents for Hashimoto's Thyroiditis  .   Current rx: t4- 25     Changes in history:she was told that she has pcos. She was put on metformin but did not tolerate it. She was then presribed wegovy but it was too expensive  She was told her testosterone was very low and is looking at using topical testosterone. We dicussed some of the controversies about using testosterone supplements in women.  I also explained that, typically, women with pcos would have a high testosterone, not a low . Also she has no acanthosis which makes me question that diagnosis   Questions /problems:see above     Subjective          Review of Systems:   Review of Systems   Constitutional:  Positive for unexpected weight change.   Endocrine:        Hair loss   Musculoskeletal:  Positive for arthralgias and gait problem.       The following portions of the patient's history were reviewed and updated as appropriate: allergies, current medications, past family history, past medical history, past social history, past surgical history, and problem list.    Objective     Visit Vitals  /74   Pulse 86   Ht 162.6 cm (64\")   Wt 95.7 kg (211 lb)   LMP  (LMP Unknown)   SpO2 97%   BMI 36.22 kg/m²           Physical Exam:  Physical Exam  Vitals reviewed.   Constitutional:       Appearance: Normal appearance.   Neck:      Comments: Indurated thyroid  Lymphadenopathy:      Cervical: No cervical adenopathy.   Neurological:      Mental Status: She is alert.         Assessment / Plan      Assessment & Plan:  Problem List Items Addressed This Visit       Hashimoto's thyroiditis - Primary    Overview     + serologies with preserved thyroid function  On low dose t4 to suppress inflammation          Current " Assessment & Plan     Clinically euthyroid. Thyroid levels ordered. Medication to be adjusted accordingly.          Relevant Medications    levothyroxine (SYNTHROID, LEVOTHROID) 25 MCG tablet    Enbrel SureClick 50 MG/ML solution auto-injector    Other Relevant Orders    TSH        Electronically signed by : Herb Rodriguez MD   04/05/2024

## 2024-04-08 RX ORDER — LEVOTHYROXINE SODIUM 0.03 MG/1
25 TABLET ORAL DAILY
Qty: 90 TABLET | Refills: 3 | Status: SHIPPED | OUTPATIENT
Start: 2024-04-08

## 2024-04-10 ENCOUNTER — PATIENT ROUNDING (BHMG ONLY) (OUTPATIENT)
Dept: URGENT CARE | Facility: CLINIC | Age: 44
End: 2024-04-10
Payer: COMMERCIAL

## 2024-05-29 ENCOUNTER — PATIENT ROUNDING (BHMG ONLY) (OUTPATIENT)
Dept: URGENT CARE | Facility: CLINIC | Age: 44
End: 2024-05-29
Payer: COMMERCIAL

## 2024-06-21 ENCOUNTER — SPECIALTY PHARMACY (OUTPATIENT)
Age: 44
End: 2024-06-21
Payer: COMMERCIAL

## 2024-06-21 PROBLEM — M06.9 RHEUMATOID ARTHRITIS: Status: ACTIVE | Noted: 2024-06-21

## 2024-06-25 ENCOUNTER — OFFICE VISIT (OUTPATIENT)
Age: 44
End: 2024-06-25
Payer: COMMERCIAL

## 2024-06-25 ENCOUNTER — LAB (OUTPATIENT)
Facility: HOSPITAL | Age: 44
End: 2024-06-25
Payer: COMMERCIAL

## 2024-06-25 VITALS
HEART RATE: 81 BPM | TEMPERATURE: 98.6 F | HEIGHT: 64 IN | BODY MASS INDEX: 36.99 KG/M2 | DIASTOLIC BLOOD PRESSURE: 64 MMHG | WEIGHT: 216.7 LBS | SYSTOLIC BLOOD PRESSURE: 110 MMHG

## 2024-06-25 DIAGNOSIS — G89.29 CHRONIC PAIN OF RIGHT ANKLE: ICD-10-CM

## 2024-06-25 DIAGNOSIS — M05.79 RHEUMATOID ARTHRITIS INVOLVING MULTIPLE SITES WITH POSITIVE RHEUMATOID FACTOR: ICD-10-CM

## 2024-06-25 DIAGNOSIS — D84.821 IMMUNOSUPPRESSION DUE TO DRUG THERAPY: ICD-10-CM

## 2024-06-25 DIAGNOSIS — M05.79 RHEUMATOID ARTHRITIS INVOLVING MULTIPLE SITES WITH POSITIVE RHEUMATOID FACTOR: Primary | ICD-10-CM

## 2024-06-25 DIAGNOSIS — M25.571 CHRONIC PAIN OF RIGHT ANKLE: ICD-10-CM

## 2024-06-25 DIAGNOSIS — M19.93 SECONDARY OSTEOARTHRITIS: ICD-10-CM

## 2024-06-25 DIAGNOSIS — Z79.899 IMMUNOSUPPRESSION DUE TO DRUG THERAPY: ICD-10-CM

## 2024-06-25 LAB
ALBUMIN SERPL-MCNC: 4 G/DL (ref 3.5–5.2)
ALBUMIN/GLOB SERPL: 1.1 G/DL
ALP SERPL-CCNC: 62 U/L (ref 39–117)
ALT SERPL W P-5'-P-CCNC: 24 U/L (ref 1–33)
ANION GAP SERPL CALCULATED.3IONS-SCNC: 11 MMOL/L (ref 5–15)
AST SERPL-CCNC: 23 U/L (ref 1–32)
BASOPHILS # BLD AUTO: 0.02 10*3/MM3 (ref 0–0.2)
BASOPHILS NFR BLD AUTO: 0.6 % (ref 0–1.5)
BILIRUB SERPL-MCNC: 0.5 MG/DL (ref 0–1.2)
BUN SERPL-MCNC: 14 MG/DL (ref 6–20)
BUN/CREAT SERPL: 20 (ref 7–25)
CALCIUM SPEC-SCNC: 9.1 MG/DL (ref 8.6–10.5)
CHLORIDE SERPL-SCNC: 106 MMOL/L (ref 98–107)
CO2 SERPL-SCNC: 22 MMOL/L (ref 22–29)
CREAT SERPL-MCNC: 0.7 MG/DL (ref 0.57–1)
CRP SERPL-MCNC: 0.47 MG/DL (ref 0–0.5)
DEPRECATED RDW RBC AUTO: 40.3 FL (ref 37–54)
EGFRCR SERPLBLD CKD-EPI 2021: 108.2 ML/MIN/1.73
EOSINOPHIL # BLD AUTO: 0.12 10*3/MM3 (ref 0–0.4)
EOSINOPHIL NFR BLD AUTO: 3.4 % (ref 0.3–6.2)
ERYTHROCYTE [DISTWIDTH] IN BLOOD BY AUTOMATED COUNT: 14.4 % (ref 12.3–15.4)
ERYTHROCYTE [SEDIMENTATION RATE] IN BLOOD: 32 MM/HR (ref 0–20)
GLOBULIN UR ELPH-MCNC: 3.6 GM/DL
GLUCOSE SERPL-MCNC: 73 MG/DL (ref 65–99)
HCT VFR BLD AUTO: 44.4 % (ref 34–46.6)
HGB BLD-MCNC: 14.7 G/DL (ref 12–15.9)
IMM GRANULOCYTES # BLD AUTO: 0 10*3/MM3 (ref 0–0.05)
IMM GRANULOCYTES NFR BLD AUTO: 0 % (ref 0–0.5)
LYMPHOCYTES # BLD AUTO: 1.38 10*3/MM3 (ref 0.7–3.1)
LYMPHOCYTES NFR BLD AUTO: 39.1 % (ref 19.6–45.3)
MCH RBC QN AUTO: 26.1 PG (ref 26.6–33)
MCHC RBC AUTO-ENTMCNC: 33.1 G/DL (ref 31.5–35.7)
MCV RBC AUTO: 78.7 FL (ref 79–97)
MONOCYTES # BLD AUTO: 0.66 10*3/MM3 (ref 0.1–0.9)
MONOCYTES NFR BLD AUTO: 18.7 % (ref 5–12)
NEUTROPHILS NFR BLD AUTO: 1.35 10*3/MM3 (ref 1.7–7)
NEUTROPHILS NFR BLD AUTO: 38.2 % (ref 42.7–76)
NRBC BLD AUTO-RTO: 0 /100 WBC (ref 0–0.2)
PLATELET # BLD AUTO: 236 10*3/MM3 (ref 140–450)
PMV BLD AUTO: 11.2 FL (ref 6–12)
POTASSIUM SERPL-SCNC: 4 MMOL/L (ref 3.5–5.2)
PROT SERPL-MCNC: 7.6 G/DL (ref 6–8.5)
RBC # BLD AUTO: 5.64 10*6/MM3 (ref 3.77–5.28)
SODIUM SERPL-SCNC: 139 MMOL/L (ref 136–145)
WBC NRBC COR # BLD AUTO: 3.53 10*3/MM3 (ref 3.4–10.8)

## 2024-06-25 PROCEDURE — 36415 COLL VENOUS BLD VENIPUNCTURE: CPT

## 2024-06-25 PROCEDURE — 86140 C-REACTIVE PROTEIN: CPT

## 2024-06-25 PROCEDURE — 85025 COMPLETE CBC W/AUTO DIFF WBC: CPT

## 2024-06-25 PROCEDURE — 80053 COMPREHEN METABOLIC PANEL: CPT

## 2024-06-25 PROCEDURE — 85652 RBC SED RATE AUTOMATED: CPT

## 2024-06-25 PROCEDURE — 99214 OFFICE O/P EST MOD 30 MIN: CPT | Performed by: INTERNAL MEDICINE

## 2024-06-25 RX ORDER — MEDROXYPROGESTERONE ACETATE 150 MG/ML
50 INJECTION, SUSPENSION INTRAMUSCULAR WEEKLY
Qty: 4.2 ML | Refills: 3 | Status: SHIPPED | OUTPATIENT
Start: 2024-06-25 | End: 2024-06-25 | Stop reason: SDUPTHER

## 2024-06-25 RX ORDER — MEDROXYPROGESTERONE ACETATE 150 MG/ML
50 INJECTION, SUSPENSION INTRAMUSCULAR WEEKLY
Qty: 4.2 ML | Refills: 3 | Status: SHIPPED | OUTPATIENT
Start: 2024-06-25

## 2024-06-25 NOTE — ASSESSMENT & PLAN NOTE
Enbrel.  1. Hold if the patient develops infection.   2. Avoid live vaccines while on this medication.   3. No recent serious infections  4. No injection site reactions.   5. Also hold this medication perioperatively if the patient is going to have a surgical procedure.

## 2024-06-25 NOTE — ASSESSMENT & PLAN NOTE
Seropositive, CCP +. DX 2005. MTX AND PLAQUENIL 2005. HUMIRA 2007. WEEKLY HUMIRA 2010. HUMIRA STOPPED AND ORENCIA ADDED 2013. ORENCIA STOPPED 2016. XELJANZ STARTED 2016, stopped 2023.  Enbrel started 2023..    Doing well on Enbrel. Had multiple infections this school year. Was on steroids frequently for respiratory problems. Off now.   TJC: 0 SJC: 0 Physician global is 0, visual analog pain scale is 3, pt global is 5.  CDAI is 5-low disease activity  Prognosis is guarded  Continue Enbrel.   Plan is q 3 month labs and f/u.   24.5

## 2024-06-25 NOTE — PROGRESS NOTES
Office Follow Up      Date: 06/25/2024   Patient Name: Ric Johnson  MRN: [id number]  YOB: 1980    Referring Physician: Liana Bowling APRN     Chief Complaint: Rheumatoid arthritis      History of Present Illness: Ric Johnson is a 44 y.o. female who is here today for follow up on rheumatoid arthritis.   Had strep and mono in the interim. Has been on prednisone for respiratory problems in the interim. Stopped all meds and was on steroids..  Joints are ok but very fatigued..  Adherence is an issue. Started Enbrel late summer. Has taken intermittently. She restarted Enbrel 4 weeks ago. Benadryl helped ISR.   Pain scale: 3/10.  No EMS. The problem has worsened. The primary symptoms reported include: pain, stiffness and swelling. The following symptoms are not reported:  functional limitation and progression of deformity. The patient assesses the interval disease activity as: worsening. The patient's assessment of treatment is: helping greatly. The patient is not experiencing side effects. The locations affected since last visit are neck, low back, bilateral shoulder, bilateral wrist, bilateral hand, left knee, right ankle, foot, hip and multiple joints. Associated symptoms include fatigue  Pertinent negatives include fever, infection, inactivity gelling, eye symptoms, change in vision, sicca complaints, skin lesion(s), chest pain, changing cough, edema, joint swelling and abdominal pain.     Subjective   Review of Systems: Review of Systems   Constitutional:  Negative for chills, fatigue, fever and unexpected weight loss.   HENT:  Negative for dental problem, mouth sores, sinus pressure and swollen glands.    Eyes:  Negative for photophobia, pain and redness.   Respiratory:  Negative for apnea, cough, chest tightness and shortness of breath.    Cardiovascular:  Negative for chest pain and leg swelling.   Gastrointestinal:  Negative for abdominal pain, diarrhea, nausea  and GERD.   Genitourinary:  Negative for dysuria and hematuria.   Skin:  Negative for dry skin, rash, skin lesions and bruise.   Neurological:  Negative for dizziness, seizures, syncope, weakness, headache and memory problem.   Hematological:  Negative for adenopathy. Does not bruise/bleed easily.   Psychiatric/Behavioral:  Negative for sleep disturbance, suicidal ideas, depressed mood and stress. The patient is not nervous/anxious.    All other systems reviewed and are negative.     Joint Exam 06/25/2024     The following joints were examined and normal:   Left Glenohumeral, Right Glenohumeral, Left Elbow, Right Elbow, Left Wrist, Right Wrist, Left MCP 1, Right MCP 1, Left MCP 2, Right MCP 2, Left MCP 3, Right MCP 3, Left MCP 4, Right MCP 4, Left MCP 5, Right MCP 5, Left IP (thumb), Right IP (thumb), Left PIP 2 (finger), Right PIP 2 (finger), Left PIP 3 (finger), Right PIP 3 (finger), Left PIP 4 (finger), Right PIP 4 (finger), Left PIP 5 (finger), Right PIP 5 (finger), Left Knee, Right Knee       Patient Global: 5 mm  Provider Global: 0 mm      Medications:   Current Outpatient Medications:     ALPRAZolam (XANAX) 0.25 MG tablet, Take 1 tablet by mouth At Night As Needed for Anxiety., Disp: , Rfl:     Cholecalciferol (VITAMIN D3) 125 MCG (5000 UT) capsule capsule, Take 1 capsule by mouth Daily., Disp: , Rfl:     Enbrel SureClick 50 MG/ML solution auto-injector, Inject 50 mg under the skin into the appropriate area as directed 1 (One) Time Per Week., Disp: 4.2 mL, Rfl: 3    Esomeprazole Magnesium (NEXIUM PO), Take 20 mg by mouth 2 (Two) Times a Day., Disp: , Rfl:     ipratropium-albuterol (DUO-NEB) 0.5-2.5 mg/3 ml nebulizer, Nebulize q 4 h prn wheezing / shortness of breath, Disp: 360 mL, Rfl: 0    levonorgestrel (Mirena, 52 MG,) 20 MCG/24HR IUD, 1 each by Intrauterine route 1 (One) Time. Placed 7/17/2018 with Dr. Munoz, Disp: , Rfl:     levothyroxine (SYNTHROID, LEVOTHROID) 25 MCG tablet, Take 1 tablet by mouth  "Daily., Disp: 90 tablet, Rfl: 3    montelukast (SINGULAIR) 10 MG tablet, Take 1 tablet by mouth Every Night., Disp: , Rfl:     promethazine (PHENERGAN) 25 MG tablet, 1 po q 4 h prn nausea / vomiting, Disp: 15 tablet, Rfl: 0    vitamin C (ASCORBIC ACID) 500 MG tablet, Take 2 tablets by mouth Daily., Disp: , Rfl:     cefdinir (OMNICEF) 300 MG capsule, Take 1 capsule by mouth 2 (Two) Times a Day., Disp: 20 capsule, Rfl: 0    Allergies:   Allergies   Allergen Reactions    Azithromycin Hives    Bactrim [Sulfamethoxazole-Trimethoprim] Hives    Ceclor [Cefaclor] Hives    Keflex [Cephalexin] Hives    Penicillins Hives    Trimethoprim Provider Review Needed    Tamiflu [Oseltamivir Phosphate] Rash    Zofran [Ondansetron Hcl] Rash       I have reviewed and updated the patient's chief complaint, history of present illness, review of systems, past medical history, surgical history, family history, social history, medications and allergy list as appropriate.     Objective    Vital Signs:   Vitals:    06/25/24 1314   BP: 110/64   Pulse: 81   Temp: 98.6 °F (37 °C)   Weight: 98.3 kg (216 lb 11.2 oz)   Height: 162.6 cm (64.02\")   PainSc:   3   PainLoc: Ankle  Comment: Right     Body mass index is 37.18 kg/m².    Physical Exam:  GENERAL: The patient is well developed and well nourished.  Cooperative and oriented times three.  Affect is normal.  Hydration appears normal.    HEENT: Normocephalic and atraumatic.  No notable alopecia.  No facial rash.  Lids and conjunctiva are normal.  Pupils are equal and sclera are clear.    NECK: Neck is supple without adenopathy, masses, or thyromegaly.    LUNGS: Effort is normal.  Lungs are clear.  CARDIOVASCULAR: Normal S1, S2.  No murmurs are heard.  ABDOMEN: Soft and nontender without hepatosplenomegaly.  EXTREMITIES: There is no edema.  I see no cyanosis or clubbing.    SKIN: Inspection and palpation are normal.  No rashes are present.    NEUROLOGIC: Gait is normal.  MUSCULOSKELETAL: Complete " joint exam was performed.  There is full range of motion of the left shoulder, elbows, wrists, and hands without notable deformities, soft tissue swelling, synovitis, or atrophy except as noted.. Right shoulder limited ROM.  Bilateral elbows have flexion contracture. Osteoarthritic changes are seen in the PIP and DIP joints.  Hips have good internal and external rotation.  Knees have no palpable effusion. There is full extension and full flexion of the B knees without pain.  Left ankle has no tenderness or swelling.  Right ankle with l no soft tissue swelling or synovitis  BACK: Straight without notable scoliosis.    Assessment / Plan      Assessment & Plan  Rheumatoid arthritis involving multiple sites with positive rheumatoid factor  Seropositive, CCP +. DX 2005. MTX AND PLAQUENIL 2005. HUMIRA 2007. WEEKLY HUMIRA 2010. HUMIRA STOPPED AND ORENCIA ADDED 2013. ORENCIA STOPPED 2016. XELJANZ STARTED 2016, stopped 2023.  Enbrel started 2023..    Doing well on Enbrel. Had multiple infections this school year. Was on steroids frequently for respiratory problems. Off now.   TJC: 0 SJC: 0 Physician global is 0, visual analog pain scale is 3, pt global is 5.  CDAI is 5-low disease activity  Prognosis is guarded  Continue Enbrel.   Plan is q 3 month labs and f/u.  Secondary osteoarthritis  Hands and wrists.  Stable.  Immunosuppression due to drug therapy  Enbrel.  1. Hold if the patient develops infection.   2. Avoid live vaccines while on this medication.   3. No recent serious infections  4. No injection site reactions.   5. Also hold this medication perioperatively if the patient is going to have a surgical procedure.  Chronic pain of right ankle  History of old fracture.  Deformity is stable but severe.     Orders Placed This Encounter   Procedures    CBC Auto Differential    Comprehensive Metabolic Panel    C-reactive Protein    Sedimentation Rate     New Medications Ordered This Visit   Medications    Enbrel SureClick  50 MG/ML solution auto-injector     Sig: Inject 50 mg under the skin into the appropriate area as directed 1 (One) Time Per Week.     Dispense:  4.2 mL     Refill:  3          Follow Up:   Return in about 3 months (around 9/25/2024).        Dk Ramirez MD  [facility]

## 2024-10-08 ENCOUNTER — TRANSCRIBE ORDERS (OUTPATIENT)
Dept: ADMINISTRATIVE | Facility: HOSPITAL | Age: 44
End: 2024-10-08
Payer: COMMERCIAL

## 2024-10-08 DIAGNOSIS — Z12.31 VISIT FOR SCREENING MAMMOGRAM: Primary | ICD-10-CM

## 2024-10-15 RX ORDER — MEDROXYPROGESTERONE ACETATE 150 MG/ML
INJECTION, SUSPENSION INTRAMUSCULAR
Qty: 4 ML | Refills: 2 | Status: SHIPPED | OUTPATIENT
Start: 2024-10-15

## 2024-10-15 NOTE — TELEPHONE ENCOUNTER
Specialty Pharmacy Patient Management Program  Per Protocol Prescription Order/Refill     Patient currently fills medications at [facility] and is not enrolled in an Rheumatology Patient Management Program.     Requested Prescriptions     Signed Prescriptions Disp Refills    Enbrel SureClick 50 MG/ML solution auto-injector 4 mL 2     Sig: INJECT 1 PEN UNDER THE SKIN EVERY 7 DAYS     Authorizing Provider: LENORA VARGHESE     Ordering User: JT CHOI     Prescription orders above were sent to the pharmacy per Collaborative Care Agreement Protocol.

## 2024-10-24 ENCOUNTER — OFFICE VISIT (OUTPATIENT)
Dept: UROLOGY | Facility: CLINIC | Age: 44
End: 2024-10-24
Payer: COMMERCIAL

## 2024-10-24 VITALS
SYSTOLIC BLOOD PRESSURE: 118 MMHG | TEMPERATURE: 98.6 F | HEIGHT: 64 IN | OXYGEN SATURATION: 97 % | WEIGHT: 210 LBS | HEART RATE: 65 BPM | BODY MASS INDEX: 35.85 KG/M2 | DIASTOLIC BLOOD PRESSURE: 72 MMHG

## 2024-10-24 DIAGNOSIS — R30.9 PAINFUL URINATION: ICD-10-CM

## 2024-10-24 DIAGNOSIS — R39.89 URETHRAL PAIN: ICD-10-CM

## 2024-10-24 DIAGNOSIS — R39.82 CHRONIC BLADDER PAIN: Primary | ICD-10-CM

## 2024-10-24 PROBLEM — N30.10 INTERSTITIAL CYSTITIS: Status: ACTIVE | Noted: 2024-10-24

## 2024-10-24 PROBLEM — N20.0 KIDNEY STONES: Status: ACTIVE | Noted: 2020-03-09

## 2024-10-24 LAB
BILIRUB BLD-MCNC: NEGATIVE MG/DL
CLARITY, POC: CLEAR
COLOR UR: NORMAL
EXPIRATION DATE: NORMAL
GLUCOSE UR STRIP-MCNC: NEGATIVE MG/DL
KETONES UR QL: NEGATIVE
LEUKOCYTE EST, POC: NEGATIVE
Lab: NORMAL
NITRITE UR-MCNC: NEGATIVE MG/ML
PH UR: 6 [PH] (ref 5–8)
PROT UR STRIP-MCNC: NEGATIVE MG/DL
RBC # UR STRIP: NEGATIVE /UL
SP GR UR: 1.01 (ref 1–1.03)
UROBILINOGEN UR QL: NORMAL

## 2024-10-24 RX ORDER — METHENAMINE, SODIUM PHOSPHATE, MONOBASIC, MONOHYDRATE, PHENYL SALICYLATE, METHYLENE BLUE, AND HYOSCYAMINE SULFATE 118; 40.8; 36; 10; .12 MG/1; MG/1; MG/1; MG/1; MG/1
1 CAPSULE ORAL 4 TIMES DAILY PRN
Qty: 30 CAPSULE | Refills: 3 | Status: SHIPPED | OUTPATIENT
Start: 2024-10-24

## 2024-10-24 RX ORDER — LANSOPRAZOLE 30 MG/1
1 CAPSULE, DELAYED RELEASE ORAL DAILY
COMMUNITY
Start: 2024-09-27

## 2024-10-24 RX ORDER — SUCRALFATE 1 G/1
1 TABLET ORAL EVERY 12 HOURS SCHEDULED
COMMUNITY
Start: 2024-09-25

## 2024-10-24 RX ORDER — ESTRADIOL 0.1 MG/G
1 CREAM VAGINAL 3 TIMES WEEKLY
Qty: 42.5 G | Refills: 12 | Status: SHIPPED | OUTPATIENT
Start: 2024-10-25

## 2024-10-24 NOTE — PROGRESS NOTES
Office Visit UTI Recurrent      Patient Name: Ric Johnson  : 1980   MRN: [id number]     Chief Complaint: History of Frequent UTI.    Chief Complaint   Patient presents with    Establish Care     Cystitis  Recurrent UTI  Has an extra tube in her L kidney, has caused problems since she was a kid  Hx of multiple kidney stones       Referring Provider: Randi Andrews APRN    History of Present Illness: Ric Johnson is a 44 y.o. female who presents today for history of multiple UTIs.  States she has had UTI since childhood and had a urethral dilation around the age of 5.  She reports uterine prolapse and GYN wants to do hysterectomy but she does not want to at this time.  She has a history of RA, Hashimotos as well as duplicated kidney.     Patient reports approximately 3 infections in the last 6 months.    UTI symptoms include: frequency, back and lower abdominal pain, fever, dysuria at times, urethral pain, and chunks in urine.  At times she will have pain with a full bladder and relief with urination.  Symptoms happen after drinking dark soda or carbonated water.    Patient currently experiencing no symptoms.     Symptoms resolve promptly when antibiotics are initiated for an episode thought to be an infection.    No history of inpatient hospitalizations for these infections.      Urine cultures show mixed urogenital kiana, states there has been no growth on her last 3 cultures.  Last treated with cefdinir in September.   Sexual activity does correlate with onset of UTI symptoms sometimes.    Patient does not use barrier contraception or spermicidal products.    Denies constipation.  Denies urinary incontinence.  Denies fecal incontinence  Drinks 2-3 stanleys per per day.  Only drinks water.    No prior history of gross hematuria.    Denies vaginal dryness.       Subjective      Review of System:   As noted in HPI      Past Medical History:   Past Medical History:   Diagnosis Date    Achilles  tendon pain     Ankle fracture     Anxiety     Asthma     Cough     Fibrocystic breast     GERD (gastroesophageal reflux disease)     Hashimoto's thyroiditis     History of abnormal cervical Pap smear     : LSIL, HPV+    History of kidney stones     History of miscarriage     Immunosuppression     IUD (intrauterine device) in place     Mirena inserted 2018    Kidney stone     Non-toxic multinodular goiter     Osteoarthritis     Ovarian cyst     PMS (premenstrual syndrome)     Polycystic ovary syndrome 2023    Rheumatoid arthritis     Rib pain     Urethral stricture     age 5    Urinary tract infection     Uterine fibroid     Uterine prolapse     Vitamin D deficiency        Past Surgical History:   Past Surgical History:   Procedure Laterality Date    COLPOSCOPY W/ BIOPSY / CURETTAGE  10/13/2008    HSIL, HR HPV+    LEEP  2008    carcinoma in situ       Family History:   Family History   Problem Relation Age of Onset    No Known Problems Mother     Diabetes type II Father     Stroke Father     Coronary artery disease Father     Thyroid disease Father     Gout Father     Kidney disease Father     Diabetes Father     Heart disease Father     Lupus Sister     Gout Brother     Diabetes type II Brother     Diabetes Brother     Diabetes Paternal Grandmother     Heart disease Paternal Grandmother     No Known Problems Son     Breast cancer Paternal Cousin         2nd cousin    Ovarian cancer Neg Hx     Colon cancer Neg Hx        Social History:   Social History     Socioeconomic History    Marital status:    Tobacco Use    Smoking status: Former     Current packs/day: 0.00     Average packs/day: 0.5 packs/day for 5.0 years (2.5 ttl pk-yrs)     Types: Cigarettes     Start date: 2006     Quit date:      Years since quittin.8     Passive exposure: Past    Smokeless tobacco: Never   Vaping Use    Vaping status: Never Used   Substance and Sexual Activity    Alcohol use: Not Currently      Comment: 0-1x monthly    Drug use: Never    Sexual activity: Yes     Partners: Male     Birth control/protection: I.U.D.       Medications:     Current Outpatient Medications:     albuterol sulfate  (90 Base) MCG/ACT inhaler, Inhale 2 puffs Every 4 (Four) Hours As Needed for Wheezing or Shortness of Air., Disp: 18 g, Rfl: 0    ALPRAZolam (XANAX) 0.25 MG tablet, Take 1 tablet by mouth At Night As Needed for Anxiety., Disp: , Rfl:     Cholecalciferol (VITAMIN D3) 125 MCG (5000 UT) capsule capsule, Take 1 capsule by mouth Daily., Disp: , Rfl:     Enbrel SureClick 50 MG/ML solution auto-injector, INJECT 1 PEN UNDER THE SKIN EVERY 7 DAYS, Disp: 4 mL, Rfl: 2    Esomeprazole Magnesium (NEXIUM PO), Take 20 mg by mouth 2 (Two) Times a Day., Disp: , Rfl:     ipratropium-albuterol (DUO-NEB) 0.5-2.5 mg/3 ml nebulizer, Nebulize q 4 h prn wheezing / shortness of breath, Disp: 360 mL, Rfl: 0    lansoprazole (PREVACID) 30 MG capsule, Take 1 capsule by mouth Daily., Disp: , Rfl:     levonorgestrel (Mirena, 52 MG,) 20 MCG/24HR IUD, 1 each by Intrauterine route 1 (One) Time. Placed 7/17/2018 with Dr. Munoz, Disp: , Rfl:     levothyroxine (SYNTHROID, LEVOTHROID) 25 MCG tablet, Take 1 tablet by mouth Daily., Disp: 90 tablet, Rfl: 3    montelukast (SINGULAIR) 10 MG tablet, Take 1 tablet by mouth Every Night., Disp: , Rfl:     mupirocin (BACTROBAN) 2 % ointment, Apply 1 Application topically to the appropriate area as directed 3 (Three) Times a Day., Disp: 15 g, Rfl: 0    promethazine (PHENERGAN) 25 MG tablet, 1 po q 4 h prn nausea / vomiting, Disp: 15 tablet, Rfl: 0    sucralfate (CARAFATE) 1 g tablet, Take 1 tablet by mouth Every 12 (Twelve) Hours., Disp: , Rfl:     vitamin C (ASCORBIC ACID) 500 MG tablet, Take 2 tablets by mouth Daily., Disp: , Rfl:     [START ON 10/25/2024] estradiol (ESTRACE) 0.1 MG/GM vaginal cream, Insert 1 g into the vagina 3 (Three) Times a Week. Apply periurethral and perivaginally 3 times a week at  "bedtime., Disp: 42.5 g, Rfl: 12    uribel (URO-MP) 118 MG capsule capsule, Take 1 capsule by mouth 4 (Four) Times a Day As Needed (painful urination)., Disp: 30 capsule, Rfl: 3    Allergies:   Allergies   Allergen Reactions    Azithromycin Hives    Bactrim [Sulfamethoxazole-Trimethoprim] Hives    Ceclor [Cefaclor] Hives    Keflex [Cephalexin] Hives    Penicillins Hives    Trimethoprim Provider Review Needed    Tamiflu [Oseltamivir Phosphate] Rash    Zofran [Ondansetron Hcl] Rash       Objective     Physical Exam:   Vital Signs:   Vitals:    10/24/24 1331   BP: 118/72   Pulse: 65   Temp: 98.6 °F (37 °C)   SpO2: 97%   Weight: 95.3 kg (210 lb)   Height: 162.6 cm (64.02\")     Body mass index is 36.03 kg/m².     Physical Exam  Vitals and nursing note reviewed.   Constitutional:       General: She is awake. She is not in acute distress.     Appearance: She is obese.   Pulmonary:      Effort: Pulmonary effort is normal.   Neurological:      Mental Status: She is alert and oriented to person, place, and time. Mental status is at baseline.   Psychiatric:         Mood and Affect: Mood normal.         Behavior: Behavior is cooperative.           Labs:   Brief Urine Lab Results  (Last result in the past 365 days)        Color   Clarity   Blood   Leuk Est   Nitrite   Protein   CREAT   Urine HCG        10/24/24 1334 Straw   Clear   Negative   Negative   Negative   Negative                        WBC   Date Value Ref Range Status   06/25/2024 3.53 3.40 - 10.80 10*3/mm3 Final     RBC   Date Value Ref Range Status   06/25/2024 5.64 (H) 3.77 - 5.28 10*6/mm3 Final     Hemoglobin   Date Value Ref Range Status   06/25/2024 14.7 12.0 - 15.9 g/dL Final     Hematocrit   Date Value Ref Range Status   06/25/2024 44.4 34.0 - 46.6 % Final     MCV   Date Value Ref Range Status   06/25/2024 78.7 (L) 79.0 - 97.0 fL Final     MCH   Date Value Ref Range Status   06/25/2024 26.1 (L) 26.6 - 33.0 pg Final     MCHC   Date Value Ref Range Status "   06/25/2024 33.1 31.5 - 35.7 g/dL Final     RDW   Date Value Ref Range Status   06/25/2024 14.4 12.3 - 15.4 % Final     RDW-SD   Date Value Ref Range Status   06/25/2024 40.3 37.0 - 54.0 fl Final     MPV   Date Value Ref Range Status   06/25/2024 11.2 6.0 - 12.0 fL Final     Platelets   Date Value Ref Range Status   06/25/2024 236 140 - 450 10*3/mm3 Final     Neutrophil %   Date Value Ref Range Status   06/25/2024 38.2 (L) 42.7 - 76.0 % Final     Lymphocyte %   Date Value Ref Range Status   06/25/2024 39.1 19.6 - 45.3 % Final     Monocyte %   Date Value Ref Range Status   06/25/2024 18.7 (H) 5.0 - 12.0 % Final     Eosinophil %   Date Value Ref Range Status   06/25/2024 3.4 0.3 - 6.2 % Final     Basophil %   Date Value Ref Range Status   06/25/2024 0.6 0.0 - 1.5 % Final     Immature Grans %   Date Value Ref Range Status   06/25/2024 0.0 0.0 - 0.5 % Final     Neutrophils, Absolute   Date Value Ref Range Status   06/25/2024 1.35 (L) 1.70 - 7.00 10*3/mm3 Final     Lymphocytes, Absolute   Date Value Ref Range Status   06/25/2024 1.38 0.70 - 3.10 10*3/mm3 Final     Monocytes, Absolute   Date Value Ref Range Status   06/25/2024 0.66 0.10 - 0.90 10*3/mm3 Final     Eosinophils, Absolute   Date Value Ref Range Status   06/25/2024 0.12 0.00 - 0.40 10*3/mm3 Final     Basophils, Absolute   Date Value Ref Range Status   06/25/2024 0.02 0.00 - 0.20 10*3/mm3 Final     Immature Grans, Absolute   Date Value Ref Range Status   06/25/2024 0.00 0.00 - 0.05 10*3/mm3 Final     nRBC   Date Value Ref Range Status   06/25/2024 0.0 0.0 - 0.2 /100 WBC Final        Glucose   Date Value Ref Range Status   06/25/2024 73 65 - 99 mg/dL Final     Sodium   Date Value Ref Range Status   06/25/2024 139 136 - 145 mmol/L Final     Potassium   Date Value Ref Range Status   06/25/2024 4.0 3.5 - 5.2 mmol/L Final     CO2   Date Value Ref Range Status   06/25/2024 22.0 22.0 - 29.0 mmol/L Final     Chloride   Date Value Ref Range Status   06/25/2024 106 98 -  107 mmol/L Final     Anion Gap   Date Value Ref Range Status   06/25/2024 11.0 5.0 - 15.0 mmol/L Final     Creatinine   Date Value Ref Range Status   06/25/2024 0.70 0.57 - 1.00 mg/dL Final     BUN   Date Value Ref Range Status   06/25/2024 14 6 - 20 mg/dL Final     BUN/Creatinine Ratio   Date Value Ref Range Status   06/25/2024 20.0 7.0 - 25.0 Final     Calcium   Date Value Ref Range Status   06/25/2024 9.1 8.6 - 10.5 mg/dL Final     eGFR Non  Amer   Date Value Ref Range Status   07/19/2021 96 >60 mL/min/1.73 Final     Alkaline Phosphatase   Date Value Ref Range Status   06/25/2024 62 39 - 117 U/L Final     Total Protein   Date Value Ref Range Status   06/25/2024 7.6 6.0 - 8.5 g/dL Final     ALT (SGPT)   Date Value Ref Range Status   06/25/2024 24 1 - 33 U/L Final     AST (SGOT)   Date Value Ref Range Status   06/25/2024 23 1 - 32 U/L Final     Total Bilirubin   Date Value Ref Range Status   06/25/2024 0.5 0.0 - 1.2 mg/dL Final     Albumin   Date Value Ref Range Status   06/25/2024 4.0 3.5 - 5.2 g/dL Final     Globulin   Date Value Ref Range Status   06/25/2024 3.6 gm/dL Final     A/G Ratio   Date Value Ref Range Status   03/04/2014 1.0 1.0 - 2.0 Final        I have reviewed the above labs.      Assessment / Plan      Assessment:  Diagnoses and all orders for this visit:    1. Chronic bladder pain (Primary)  -     POC Urinalysis Dipstick, Automated    2. Painful urination  -     uribel (URO-MP) 118 MG capsule capsule; Take 1 capsule by mouth 4 (Four) Times a Day As Needed (painful urination).  Dispense: 30 capsule; Refill: 3    3. Urethral pain  -     estradiol (ESTRACE) 0.1 MG/GM vaginal cream; Insert 1 g into the vagina 3 (Three) Times a Week. Apply periurethral and perivaginally 3 times a week at bedtime.  Dispense: 42.5 g; Refill: 12         44 y.o. female presents presents today for history of multiple UTIs.  States she has had UTI since childhood and had a urethral dilation around the age of 5.  She  reports uterine prolapse and GYN wants to do hysterectomy but she does not want to at this time.  She has a history of RA, Hashimotos as well as duplicated kidney.     Patient reports approximately 3 infections in the last 6 months.    UTI symptoms include: frequency, back and lower abdominal pain, fever, dysuria at times, urethral pain, and chunks in urine.  At times she will have pain with a full bladder and relief with urination.  Symptoms happen after drinking dark soda or carbonated water.  Patient currently experiencing no symptoms.     Symptoms resolve promptly when antibiotics are initiated for an episode thought to be an infection.  Urine cultures show mixed urogenital kiana, states there has been no growth on her last 3 cultures.  Last treated with cefdinir in September.  UA today did not indicate infection.     Concern for IC given history of autoimmune, symptoms, identified triggers, and negative cultures.      O'McAlpin/Rory Voiding and Pain Indices  Symptom Index score 6  Problem Index score 2     Discussed treatment options of elimination diet, daily medications, PRN medications, and PRN bladder cocktails.  Patient was given IC dietary sheet and information on IC support.  Will start uribel for PRN use.  Elimination diet.  Cautious with amitriptyline or hydroxyzine use due to daily xanax due to sedative effect.  Reports she has taken hydroxyzine in the past for anxiety with no issues.  Follow up in 4 weeks with PVR and UA.  Okay to call for PRN bladder cocktail.      Reports urethral pain and perimenopausal, start estradiol 1 gram 3 times weekly at bedtime.        Plan:  Estradiol 1 gram 3 times weekly at bedtime  Uribel 118 mg PO QID PRN for painful urination, do not take longer than 3 days.    Guidance UTI to rule out IC  Bladder cocktail PRN  Follow up in 4 weeks with UA and PVR      Follow Up:   Return in about 4 weeks (around 11/21/2024) for UA, with PVR, recheck with Chastity.      Tran  TANK Ko  [facility] [place]

## 2024-10-28 ENCOUNTER — TELEPHONE (OUTPATIENT)
Dept: UROLOGY | Facility: CLINIC | Age: 44
End: 2024-10-28
Payer: COMMERCIAL

## 2024-10-28 DIAGNOSIS — N30.00 ACUTE CYSTITIS WITHOUT HEMATURIA: ICD-10-CM

## 2024-10-28 DIAGNOSIS — N76.0 GARDNERELLA VAGINALIS INFECTION: Primary | ICD-10-CM

## 2024-10-28 DIAGNOSIS — B96.89 GARDNERELLA VAGINALIS INFECTION: Primary | ICD-10-CM

## 2024-10-28 RX ORDER — METRONIDAZOLE 7.5 MG/G
1 GEL VAGINAL NIGHTLY
Qty: 7 APPLICATION | Refills: 0 | Status: SHIPPED | OUTPATIENT
Start: 2024-10-28

## 2024-10-28 RX ORDER — NITROFURANTOIN 25; 75 MG/1; MG/1
100 CAPSULE ORAL 2 TIMES DAILY
Qty: 14 CAPSULE | Refills: 0 | Status: SHIPPED | OUTPATIENT
Start: 2024-10-28

## 2024-10-28 NOTE — TELEPHONE ENCOUNTER
Ms. 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  Will prescribed Flagyl which is to be administered in the vagina nightly for a total of 7 days.      Her urine was positive for viridans group streptococcus [id number] CFU which is susceptible to Macrobid which has also been prescribed.  There are 3 other bacteria that are present in the urine that less than 10,000 CFU and are not clinically significant for causing UTI symptoms or active infection.      Will have Ms. Johnson follow up with Tran as scheduled.  Will have CMA contact Ms. Johnson to notify her of my instructions and recommendations for treatment.

## 2024-11-22 ENCOUNTER — OFFICE VISIT (OUTPATIENT)
Dept: UROLOGY | Facility: CLINIC | Age: 44
End: 2024-11-22
Payer: COMMERCIAL

## 2024-11-22 VITALS
HEIGHT: 64 IN | DIASTOLIC BLOOD PRESSURE: 60 MMHG | HEART RATE: 100 BPM | SYSTOLIC BLOOD PRESSURE: 100 MMHG | TEMPERATURE: 98.2 F | WEIGHT: 210 LBS | BODY MASS INDEX: 35.85 KG/M2 | OXYGEN SATURATION: 96 %

## 2024-11-22 DIAGNOSIS — N30.00 ACUTE CYSTITIS WITHOUT HEMATURIA: ICD-10-CM

## 2024-11-22 DIAGNOSIS — R30.0 DYSURIA: ICD-10-CM

## 2024-11-22 DIAGNOSIS — N30.10 INTERSTITIAL CYSTITIS: Primary | ICD-10-CM

## 2024-11-22 DIAGNOSIS — N20.0 NEPHROLITHIASIS: ICD-10-CM

## 2024-11-22 LAB
BILIRUB BLD-MCNC: NEGATIVE MG/DL
CLARITY, POC: ABNORMAL
COLOR UR: ABNORMAL
EXPIRATION DATE: ABNORMAL
GLUCOSE UR STRIP-MCNC: NEGATIVE MG/DL
KETONES UR QL: NEGATIVE
LEUKOCYTE EST, POC: ABNORMAL
Lab: ABNORMAL
NITRITE UR-MCNC: POSITIVE MG/ML
PH UR: 6 [PH] (ref 5–8)
PROT UR STRIP-MCNC: NEGATIVE MG/DL
RBC # UR STRIP: ABNORMAL /UL
SP GR UR: 1.01 (ref 1–1.03)
UROBILINOGEN UR QL: ABNORMAL

## 2024-11-22 RX ORDER — NITROFURANTOIN 25; 75 MG/1; MG/1
100 CAPSULE ORAL 2 TIMES DAILY
Qty: 14 CAPSULE | Refills: 0 | Status: SHIPPED | OUTPATIENT
Start: 2024-11-22

## 2024-11-22 RX ORDER — PHENAZOPYRIDINE HYDROCHLORIDE 100 MG/1
100 TABLET, FILM COATED ORAL 3 TIMES DAILY PRN
Qty: 30 TABLET | Refills: 1 | Status: SHIPPED | OUTPATIENT
Start: 2024-11-22

## 2024-11-22 RX ORDER — HYDROXYZINE HYDROCHLORIDE 25 MG/1
25 TABLET, FILM COATED ORAL NIGHTLY
Qty: 30 TABLET | Refills: 5 | Status: SHIPPED | OUTPATIENT
Start: 2024-11-22

## 2024-11-22 NOTE — PROGRESS NOTES
Office Visit General Established Female Patient     Patient Name: Ric Johnson  : 1980   MRN: [id number]     Chief Complaint:   Chief Complaint   Patient presents with    Follow-up     Bladder & urethra pain       Referring Provider: No ref. provider found    History of Present Illness: Ric Johnson is a 44 y.o. female who presents for follow up for Martha with concern for IC and nephrolithiasis.  Elimination diet has made a huge difference in her pain.  Has been avoiding triggers such as carbonated beverages, citrus foods/drinks, tomato based foods, accidentally had pineapple on Wednesday and symptoms presented the following day.  Complains of urgency, dysuria, frequency, chills, fever of 100.  Took ibuprofen and pyridium last night.  She is using estradiol cream.         Subjective      Review of System:   As noted in HPI.    Past Medical History:   Past Medical History:   Diagnosis Date    Abnormal Pap smear of cervix     Achilles tendon pain     Ankle fracture     Anxiety     Asthma     Cough     Fibrocystic breast     GERD (gastroesophageal reflux disease)     Hashimoto's thyroiditis     History of abnormal cervical Pap smear     : LSIL, HPV+    History of kidney stones     History of miscarriage     Immunosuppression     Interstitial cystitis     IUD (intrauterine device) in place     Mirena inserted 2018    Kidney stone     Non-toxic multinodular goiter     Osteoarthritis     Ovarian cyst     PMS (premenstrual syndrome)     Polycystic ovary syndrome 2023    Rheumatoid arthritis     Rib pain     Urethral stricture     age 5    Urinary tract infection     Uterine fibroid     Uterine prolapse 2016    Vitamin D deficiency        Past Surgical History:   Past Surgical History:   Procedure Laterality Date    COLPOSCOPY W/ BIOPSY / CURETTAGE  10/13/2008    HSIL, HR HPV+    LEEP  2008    carcinoma in situ       Family History:   Family History   Problem Relation Age of  Onset    No Known Problems Mother     Diabetes type II Father     Stroke Father     Coronary artery disease Father     Thyroid disease Father     Gout Father     Kidney disease Father     Diabetes Father     Heart disease Father     Lupus Sister     Gout Brother     Diabetes type II Brother     Diabetes Brother     Diabetes Paternal Grandmother     Heart disease Paternal Grandmother     No Known Problems Son     Breast cancer Paternal Cousin         2nd cousin    Ovarian cancer Neg Hx     Colon cancer Neg Hx        Social History:   Social History     Socioeconomic History    Marital status:    Tobacco Use    Smoking status: Former     Current packs/day: 0.00     Average packs/day: 0.5 packs/day for 5.0 years (2.5 ttl pk-yrs)     Types: Cigarettes     Start date: 2006     Quit date:      Years since quittin.9     Passive exposure: Past    Smokeless tobacco: Never   Vaping Use    Vaping status: Never Used   Substance and Sexual Activity    Alcohol use: Not Currently     Comment: 0-1x monthly    Drug use: Never    Sexual activity: Yes     Partners: Male     Birth control/protection: I.U.D.       Medications:     Current Outpatient Medications:     albuterol sulfate  (90 Base) MCG/ACT inhaler, Inhale 2 puffs Every 4 (Four) Hours As Needed for Wheezing or Shortness of Air., Disp: 18 g, Rfl: 0    ALPRAZolam (XANAX) 0.25 MG tablet, Take 1 tablet by mouth 2 (Two) Times a Day., Disp: , Rfl:     Cholecalciferol (VITAMIN D3) 125 MCG (5000 UT) capsule capsule, Take 1 capsule by mouth Daily., Disp: , Rfl:     Enbrel SureClick 50 MG/ML solution auto-injector, INJECT 1 PEN UNDER THE SKIN EVERY 7 DAYS, Disp: 4 mL, Rfl: 2    Esomeprazole Magnesium (NEXIUM PO), Take 20 mg by mouth 2 (Two) Times a Day., Disp: , Rfl:     estradiol (ESTRACE) 0.1 MG/GM vaginal cream, Insert 1 g into the vagina 3 (Three) Times a Week. Apply periurethral and perivaginally 3 times a week at bedtime., Disp: 42.5 g, Rfl: 12     "ipratropium-albuterol (DUO-NEB) 0.5-2.5 mg/3 ml nebulizer, Nebulize q 4 h prn wheezing / shortness of breath, Disp: 360 mL, Rfl: 0    levonorgestrel (Mirena, 52 MG,) 20 MCG/24HR IUD, 1 each by Intrauterine route 1 (One) Time. Placed 7/17/2018 with Dr. Munoz, Disp: , Rfl:     levothyroxine (SYNTHROID, LEVOTHROID) 25 MCG tablet, Take 1 tablet by mouth Daily., Disp: 90 tablet, Rfl: 3    montelukast (SINGULAIR) 10 MG tablet, Take 1 tablet by mouth Every Night., Disp: , Rfl:     mupirocin (BACTROBAN) 2 % ointment, Apply 1 Application topically to the appropriate area as directed 3 (Three) Times a Day., Disp: 15 g, Rfl: 0    promethazine (PHENERGAN) 25 MG tablet, 1 po q 4 h prn nausea / vomiting, Disp: 15 tablet, Rfl: 0    sucralfate (CARAFATE) 1 g tablet, Take 1 tablet by mouth Every 12 (Twelve) Hours., Disp: , Rfl:     vitamin C (ASCORBIC ACID) 500 MG tablet, Take 2 tablets by mouth Daily., Disp: , Rfl:     hydrOXYzine (ATARAX) 25 MG tablet, Take 1 tablet by mouth Every Night., Disp: 30 tablet, Rfl: 5    nitrofurantoin, macrocrystal-monohydrate, (Macrobid) 100 MG capsule, Take 1 capsule by mouth 2 (Two) Times a Day., Disp: 14 capsule, Rfl: 0    phenazopyridine (PYRIDIUM) 100 MG tablet, Take 1 tablet by mouth 3 (Three) Times a Day As Needed for Dysuria. Do not take >3 days, Disp: 30 tablet, Rfl: 1    Allergies:   Allergies   Allergen Reactions    Azithromycin Hives    Bactrim [Sulfamethoxazole-Trimethoprim] Hives    Ceclor [Cefaclor] Hives    Keflex [Cephalexin] Hives    Penicillins Hives    Trimethoprim Provider Review Needed    Tamiflu [Oseltamivir Phosphate] Rash    Zofran [Ondansetron Hcl] Rash       Objective     Physical Exam:   Vital Signs:   Visit Vitals  /60   Pulse 100   Temp 98.2 °F (36.8 °C)   Ht 162.6 cm (64.02\")   Wt 95.3 kg (210 lb)   SpO2 96%   BMI 36.03 kg/m²      Body mass index is 36.03 kg/m².   Physical Exam  Vitals and nursing note reviewed.   Constitutional:       General: She is awake. She is " not in acute distress.     Appearance: She is obese.   Pulmonary:      Effort: Pulmonary effort is normal.   Neurological:      Mental Status: She is alert and oriented to person, place, and time. Mental status is at baseline.   Psychiatric:         Mood and Affect: Mood normal.         Behavior: Behavior is cooperative.          Labs  Brief Urine Lab Results  (Last result in the past 365 days)        Color   Clarity   Blood   Leuk Est   Nitrite   Protein   CREAT   Urine HCG        11/22/24 1430 Dark Yellow   Cloudy   Trace   Small (1+)   Positive   Negative                   Lab Results   Component Value Date    GLUCOSE 73 06/25/2024    CALCIUM 9.1 06/25/2024     06/25/2024    K 4.0 06/25/2024    CO2 22.0 06/25/2024     06/25/2024    BUN 14 06/25/2024    CREATININE 0.70 06/25/2024    EGFRIFNONA 96 07/19/2021    BCR 20.0 06/25/2024    ANIONGAP 11.0 06/25/2024       Lab Results   Component Value Date    WBC 3.53 06/25/2024    HGB 14.7 06/25/2024    HCT 44.4 06/25/2024    MCV 78.7 (L) 06/25/2024     06/25/2024         I have reviewed the above labs.    PVR  Post-void residual performed with ultrasound by staff and interpreted by me -  mL     Assessment / Plan      Assessment:   Diagnoses and all orders for this visit:    1. Interstitial cystitis (Primary)  -     POC Urinalysis Dipstick, Automated  -     phenazopyridine (PYRIDIUM) 100 MG tablet; Take 1 tablet by mouth 3 (Three) Times a Day As Needed for Dysuria. Do not take >3 days  Dispense: 30 tablet; Refill: 1  -     hydrOXYzine (ATARAX) 25 MG tablet; Take 1 tablet by mouth Every Night.  Dispense: 30 tablet; Refill: 5    2. Acute cystitis without hematuria  -     Urine Culture - Urine, Urine, Clean Catch; Future  -     nitrofurantoin, macrocrystal-monohydrate, (Macrobid) 100 MG capsule; Take 1 capsule by mouth 2 (Two) Times a Day.  Dispense: 14 capsule; Refill: 0  -     phenazopyridine (PYRIDIUM) 100 MG tablet; Take 1 tablet by mouth 3 (Three)  Times a Day As Needed for Dysuria. Do not take >3 days  Dispense: 30 tablet; Refill: 1    3. Dysuria  -     phenazopyridine (PYRIDIUM) 100 MG tablet; Take 1 tablet by mouth 3 (Three) Times a Day As Needed for Dysuria. Do not take >3 days  Dispense: 30 tablet; Refill: 1    4. Nephrolithiasis  -     CT Abdomen Pelvis Stone Protocol; Future         44 y.o. female presents for follow up for Martha with concern for IC and nephrolithiasis.      Elimination diet has made a huge difference in her pain.  Has been avoiding triggers such as carbonated beverages, citrus foods/drinks, tomato based foods, accidentally had pineapple on Wednesday and symptoms presented the following day.  Complains of urgency, dysuria, frequency, chills, fever of 100.  Took ibuprofen and pyridium last night.  UA positive for leukocytes, nitrites, and blood-possible false positive with pyridium use.  Will send for culture and treat with macrobid 100 mg PO BID x7 days.  Start pyridium 100 mg PO TID PRN dysuria.    Patient reports history of passing around 8 stones in her lifetime, never required intervention.  [facility] performed in 2020 showed low urine volume of 1.22 liters/day.  No recent imaging.  Will order CT abdomen/pelvis stone protocol for evaluate stone burden.       Plan:  Urine culture  Start macrobid 100 mg PO BID x7 days  Pyridium 100 mg TID PO PRN dysuria-do not exceed >3 days  Start hydroxyzine 25 mg PO nightly, start with 12.5 mg  CT stone protocol  Follow up in 3 months for recheck with UA      Follow Up:   Return in about 3 months (around 2/22/2025) for recheck with MAMIE Mayfield.    TANK Connolly  [facility] [place]

## 2024-11-26 LAB
BACTERIA UR CULT: ABNORMAL
BACTERIA UR CULT: ABNORMAL
OTHER ANTIBIOTIC SUSC ISLT: ABNORMAL

## 2024-12-17 NOTE — TELEPHONE ENCOUNTER
Caller: Ric Oneill    Relationship: Self    Best call back number: [phone]    Requested Prescriptions:   Requested Prescriptions     Pending Prescriptions Disp Refills    Enbrel SureClick 50 MG/ML solution auto-injector 4 mL 2      Pharmacy where request should be sent: [facility]      Last office visit with prescribing clinician: 6/25/2024     Next office visit with prescribing clinician: 4/1/2025     Additional details provided by patient: PATIENT STATES SHE HAS COVID AND STREP AND HAD TO CANCEL HER APPOINTMENT ON 12/18/24 W/ DR. VARGHESE. RESCHEDULED PATIENT TO APRIL 2025 AND PATIENT STATES SHE ONLY HAS A MONTH WORTH SUPPLY OF THIS MEDICATION AND WILL BE OUT BY HER NEXT FOLLOW-UP APPOINTMENT. PLEASE ADVISE.    Does the patient have less than a 3 day supply:  [] Yes  [x] No    Would you like a call back once the refill request has been completed: [x] Yes [] No    If the office needs to give you a call back, can they leave a voicemail: [x] Yes [] No

## 2024-12-18 RX ORDER — MEDROXYPROGESTERONE ACETATE 150 MG/ML
1 INJECTION, SUSPENSION INTRAMUSCULAR
Qty: 4 ML | Refills: 4 | Status: SHIPPED | OUTPATIENT
Start: 2024-12-18

## 2024-12-19 ENCOUNTER — HOSPITAL ENCOUNTER (OUTPATIENT)
Dept: CT IMAGING | Facility: HOSPITAL | Age: 44
Discharge: HOME | End: 2024-12-19
Admitting: NURSE PRACTITIONER
Payer: COMMERCIAL

## 2024-12-19 DIAGNOSIS — N20.0 NEPHROLITHIASIS: ICD-10-CM

## 2024-12-19 PROCEDURE — 74176 CT ABD & PELVIS W/O CONTRAST: CPT

## 2024-12-20 ENCOUNTER — TELEPHONE (OUTPATIENT)
Dept: SURGERY | Facility: CLINIC | Age: 44
End: 2024-12-20
Payer: COMMERCIAL

## 2024-12-20 DIAGNOSIS — K82.9 GALLBLADDER PROBLEM: Primary | ICD-10-CM

## 2024-12-20 NOTE — TELEPHONE ENCOUNTER
Left a Vm for the pt regarding a referral we received from TANK Batista for possible Gallbladder issues. Will try again to reach the pt to schedule an office apt with Dr. Coombs-1st attempt.

## 2024-12-26 ENCOUNTER — HOSPITAL ENCOUNTER (OUTPATIENT)
Dept: MAMMOGRAPHY | Facility: HOSPITAL | Age: 44
Discharge: HOME | End: 2024-12-26
Admitting: OBSTETRICS & GYNECOLOGY
Payer: COMMERCIAL

## 2024-12-26 DIAGNOSIS — Z12.31 VISIT FOR SCREENING MAMMOGRAM: ICD-10-CM

## 2024-12-26 PROCEDURE — 77067 SCR MAMMO BI INCL CAD: CPT

## 2024-12-26 PROCEDURE — 77063 BREAST TOMOSYNTHESIS BI: CPT

## 2025-01-14 NOTE — PROGRESS NOTES
Subjective   Ric Johnson is a 44 y.o. female.   Chief Complaint   Patient presents with    Cholelithiasis        History of Present Illness   Ms. Johnson is a 43 yo female patient referred by urology for evaluation of possible gallstones found incidentally on a CT scan of the abdomen and pelvis performed on 12/26/2024.  The scan was performed to evaluate for nephrolithiasis.  The patient reports that she has a longstanding history of interstitial cystitis, which is predominantly controlled with dietary measures.She avoids triggering food including carbonated beverages, citrus foods/drinks, tomato-based foods, among other things.  She does not report a history of jaundice, acholic stools, or dark urine.  She reports having significant abdominal pain before getting her symptoms under control with an elimination diet.    CT performed as described above demonstrates multiple hypodensities within the gallbladder lumen essentially filling the lumen, and worrisome for stones.    The following portions of the patient's history were reviewed and updated as appropriate: allergies, current medications, past family history, past medical history, past social history, past surgical history, and problem list.    Patient Active Problem List   Diagnosis    IUD check up    Annual physical exam    History of abnormal cervical Pap smear    Hashimoto's thyroiditis    Urinary frequency    Urinary urgency    Pelvic pain    Leiomyoma of uterus, unspecified    Encounter for annual routine gynecological examination    History of abnormal cervical Pap smear    Ankle arthritis    Rheumatoid arthritis    Secondary osteoarthritis    Immunosuppression due to drug therapy    Chronic pain of right ankle    Nephrolithiasis    Urethral pain    Dysuria    Bladder pain    Interstitial cystitis    Acute cystitis without hematuria    Gallbladder problem       Past Medical History:   Diagnosis Date    Abnormal Pap smear of cervix 2008    Achilles  tendon pain     Ankle fracture     Anxiety     Asthma     Cough     Fibrocystic breast     GERD (gastroesophageal reflux disease)     Hashimoto's thyroiditis     History of abnormal cervical Pap smear     2008: LSIL, HPV+    History of kidney stones     History of miscarriage     Immunosuppression     Interstitial cystitis     IUD (intrauterine device) in place     Mirena inserted 7/17/2018    Kidney stone     Non-toxic multinodular goiter     Osteoarthritis     Ovarian cyst     PMS (premenstrual syndrome)     Polycystic ovary syndrome 05/2023    PONV (postoperative nausea and vomiting) 1985    Rheumatoid arthritis     Rib pain     Urethral stricture     age 5    Urinary tract infection     Uterine fibroid     Uterine prolapse 2016    Vitamin D deficiency        Past Surgical History:   Procedure Laterality Date    COLPOSCOPY W/ BIOPSY / CURETTAGE  10/13/2008    HSIL, HR HPV+    LEEP  11/04/2008    carcinoma in situ       Medications:     Current Outpatient Medications:     albuterol sulfate  (90 Base) MCG/ACT inhaler, Inhale 2 puffs Every 4 (Four) Hours As Needed for Wheezing or Shortness of Air., Disp: 18 g, Rfl: 0    ALPRAZolam (XANAX) 0.25 MG tablet, Take 1 tablet by mouth 2 (Two) Times a Day., Disp: , Rfl:     Cholecalciferol (VITAMIN D3) 125 MCG (5000 UT) capsule capsule, Take 1 capsule by mouth Daily., Disp: , Rfl:     Enbrel SureClick 50 MG/ML solution auto-injector, Inject 1 mL under the skin into the appropriate area as directed Every 7 (Seven) Days., Disp: 4 mL, Rfl: 4    Esomeprazole Magnesium (NEXIUM PO), Take 20 mg by mouth 2 (Two) Times a Day., Disp: , Rfl:     estradiol (ESTRACE) 0.1 MG/GM vaginal cream, Insert 1 g into the vagina 3 (Three) Times a Week. Apply periurethral and perivaginally 3 times a week at bedtime., Disp: 42.5 g, Rfl: 12    hydrOXYzine (ATARAX) 25 MG tablet, Take 1 tablet by mouth Every Night., Disp: 30 tablet, Rfl: 5    ipratropium-albuterol (DUO-NEB) 0.5-2.5 mg/3 ml  nebulizer, Nebulize q 4 h prn wheezing / shortness of breath, Disp: 360 mL, Rfl: 0    levonorgestrel (Mirena, 52 MG,) 20 MCG/24HR IUD, 1 each by Intrauterine route 1 (One) Time. Placed 7/17/2018 with Dr. Munoz, Disp: , Rfl:     levothyroxine (SYNTHROID, LEVOTHROID) 25 MCG tablet, Take 1 tablet by mouth Daily., Disp: 90 tablet, Rfl: 3    montelukast (SINGULAIR) 10 MG tablet, Take 1 tablet by mouth Every Night., Disp: , Rfl:     phenazopyridine (PYRIDIUM) 100 MG tablet, Take 1 tablet by mouth 3 (Three) Times a Day As Needed for Dysuria. Do not take >3 days, Disp: 30 tablet, Rfl: 1    Allergies:   Allergies   Allergen Reactions    Azithromycin Hives    Bactrim [Sulfamethoxazole-Trimethoprim] Hives    Ceclor [Cefaclor] Hives    Keflex [Cephalexin] Hives    Penicillins Hives         Beta lactam allergy details  Antibiotic reaction: other  Age at reaction: unknown  Dose to reaction time: unknown  Reason for antibiotic: unknown  Epinephrine required for reaction?: unknown  Tolerated antibiotics: unknown       Trimethoprim Provider Review Needed    Tamiflu [Oseltamivir Phosphate] Rash    Zofran [Ondansetron Hcl] Rash         Family History   Problem Relation Age of Onset    No Known Problems Mother     Diabetes type II Father     Stroke Father     Coronary artery disease Father     Thyroid disease Father     Gout Father     Kidney disease Father     Diabetes Father     Heart disease Father     Lupus Sister     Gout Brother     Diabetes type II Brother     Diabetes Brother     Diabetes Paternal Grandmother     Heart disease Paternal Grandmother     No Known Problems Son     Breast cancer Paternal Cousin         2nd cousin    Alcohol abuse Maternal Grandfather     Alcohol abuse Paternal Grandfather     Ovarian cancer Neg Hx     Colon cancer Neg Hx        Social History     Socioeconomic History    Marital status:    Tobacco Use    Smoking status: Former     Current packs/day: 0.00     Average packs/day: 0.5 packs/day  "for 5.0 years (2.5 ttl pk-yrs)     Types: Cigarettes     Start date: 2006     Quit date:      Years since quittin.1     Passive exposure: Past    Smokeless tobacco: Never   Vaping Use    Vaping status: Never Used   Substance and Sexual Activity    Alcohol use: Not Currently     Comment: 0-1x monthly    Drug use: Never    Sexual activity: Yes     Partners: Male     Birth control/protection: I.U.D.       Review of Systems   Constitutional:  Negative for chills, fever and unexpected weight loss.   HENT:  Negative for hearing loss, trouble swallowing and voice change.    Eyes:  Negative for visual disturbance.   Respiratory:  Negative for apnea, cough, chest tightness, shortness of breath and wheezing.    Cardiovascular:  Negative for chest pain, palpitations and leg swelling.   Gastrointestinal:  Negative for abdominal distention, abdominal pain, anal bleeding, blood in stool, constipation, diarrhea, nausea, rectal pain, vomiting, GERD and indigestion.   Endocrine: Negative for cold intolerance and heat intolerance.   Genitourinary:  Negative for difficulty urinating, dysuria and flank pain.   Musculoskeletal:  Negative for back pain and gait problem.   Skin:  Negative for color change, rash, skin lesions and wound.   Neurological:  Negative for dizziness, syncope, speech difficulty, weakness, light-headedness and numbness.   Hematological:  Negative for adenopathy. Does not bruise/bleed easily.   Psychiatric/Behavioral:  Negative for depressed mood. The patient is not nervous/anxious.        Objective   /72   Pulse 76   Ht 162.6 cm (64.02\")   Wt 91.2 kg (201 lb)   SpO2 99%   BMI 34.48 kg/m²     Physical Exam  Constitutional:       Appearance: She is well-developed.   HENT:      Head: Normocephalic and atraumatic.   Eyes:      General: No scleral icterus.  Cardiovascular:      Rate and Rhythm: Regular rhythm.   Pulmonary:      Effort: Pulmonary effort is normal.   Abdominal:      General: There " is no distension.      Palpations: Abdomen is soft.      Tenderness: There is no abdominal tenderness.   Skin:     General: Skin is warm and dry.   Neurological:      Mental Status: She is alert and oriented to person, place, and time.   Psychiatric:         Behavior: Behavior normal.       Outside Records:  I have taken the opportunity to review the patient's available outside records in paper format, scanned format and those available on Care Everywhere    Results Review:    I have also personally reviewed the relevant patient imaging.       Narrative & Impression   PROCEDURE: CT ABDOMEN PELVIS STONE PROTOCOL-     HISTORY: nephrolithiasis; N20.0-Calculus of kidney     COMPARISON: 03/11/2020.     PROCEDURE: Axial images were obtained from the lung bases to the pubic  symphysis by computed tomography. This study was performed with  techniques to keep radiation doses as low as reasonably achievable,  (ALARA). Individualized dose reduction techniques using automated  exposure control or adjustment of mA and/or kV according to the patient  size were employed.     FINDINGS:     ABDOMEN: The lung bases are clear. The heart is proper size. The limited  non-contrast images of the liver are unremarkable. Gallbladder contains  multiple hypodense lesions essentially filling the gallbladder and most  consistent with stones. No wall thickening or pericholecystic fluid  identified. Gallbladder ultrasound may be helpful. The spleen is  unremarkable. No adrenal masses are seen. The aorta is normal in  caliber. There is no significant free fluid or adenopathy.  There is no  left nephrolithiasis. There is no hydronephrosis. There is a tiny stone  inferior pole right kidney which is stable from the prior exam. Scarring  superior pole left kidney is stable.     PELVIS: The GI tract demonstrate no obstruction. The appendix is  identified and contains an area of high attenuation proximally, suspect  appendicolith. Otherwise appendix  appears normal. The urinary bladder is  partially filled and appears unremarkable. There is no fluid or  adenopathy. Uterus is midline and contains an IUD.     IMPRESSION:  Right nephrolithiasis without hydronephrosis.     Probable cholelithiasis, gallbladder ultrasound may be helpful.        CTDI: 14.62 mGy  DLP:692.65 mGy.cm     This report was signed and finalized on 12/19/2024 9:06 AM by Talia Sims MD.       Assessment & Plan   Diagnoses and all orders for this visit:    1. Calculus of gallbladder without cholecystitis without obstruction (Primary)  -     US Gallbladder; Future      I recommended to the patient that we proceed with ultrasound imaging of the gallbladder for more definitive evaluation for calculus gallbladder disease.  She was agreeable, and this was performed without incident in the office today.  Not surprisingly, this demonstrated numerous stones within the gallbladder lumen, with a normal caliber common bile duct, and normal thickness of the gallbladder wall.  I discussed the management of gallstones with the patient in detail.  I do suspect that she is having minimal symptoms from her gallstones simply because she is already on a very restricted diet in order to control her symptoms related to her underlying interstitial cystitis.  However, given her stone burden, I do think she is at risk for developing an emergency condition related to the gallbladder, such as intractable biliary colic, or acute calculus cholecystitis.  Choledocholithiasis with biliary obstruction is another possibility, although felt to be less likely given that the stones are on the larger side.  I recommended that she consider moving forward with a laparoscopic cholecystectomy for definitive management.   We discussed the laparoscopic cholecystectomy procedure in detail along with the risks, benefits, and alternatives.  We specifically discussed the risks of bleeding, infection, conversion to an open procedure,  postoperative bile leak, common bile duct injury, the need for ERCP (in the setting of bile leak, choledocholithiasis, etc.), and the risks related to anesthesia.  When she is ready to move forward with scheduling, my office will facilitate this process, and make arrangements for preadmission testing.  The patient was counseled regarding the signs and symptoms of concern as it pertains to her gallbladder.  She would benefit from continued adherence to her current dietary restrictions for symptomatic management.

## 2025-01-21 ENCOUNTER — OFFICE VISIT (OUTPATIENT)
Dept: SURGERY | Facility: CLINIC | Age: 45
End: 2025-01-21
Payer: COMMERCIAL

## 2025-01-21 VITALS
SYSTOLIC BLOOD PRESSURE: 126 MMHG | OXYGEN SATURATION: 99 % | WEIGHT: 201 LBS | HEART RATE: 76 BPM | DIASTOLIC BLOOD PRESSURE: 72 MMHG | BODY MASS INDEX: 34.31 KG/M2 | HEIGHT: 64 IN

## 2025-01-21 DIAGNOSIS — K80.20 CALCULUS OF GALLBLADDER WITHOUT CHOLECYSTITIS WITHOUT OBSTRUCTION: Primary | ICD-10-CM

## 2025-01-21 PROCEDURE — 99204 OFFICE O/P NEW MOD 45 MIN: CPT | Performed by: SURGERY

## 2025-02-20 ENCOUNTER — TELEPHONE (OUTPATIENT)
Age: 45
End: 2025-02-20
Payer: COMMERCIAL

## 2025-02-20 NOTE — TELEPHONE ENCOUNTER
NOTIFIED PT THRU [facility] MESSENGER, AND SENT A LETTER VIA [employer] AND [employer] ADVISING OF [facility]S CANCELLATION AND THAT HE WILL BE OUT OF OFFICE.    ADDED PT TO WAITLIST.    -KASI

## 2025-02-22 PROBLEM — R05.9 COUGH: Status: ACTIVE | Noted: 2025-02-22

## 2025-03-06 ENCOUNTER — OFFICE VISIT (OUTPATIENT)
Dept: UROLOGY | Facility: CLINIC | Age: 45
End: 2025-03-06
Payer: COMMERCIAL

## 2025-03-06 VITALS
HEART RATE: 67 BPM | DIASTOLIC BLOOD PRESSURE: 68 MMHG | WEIGHT: 193 LBS | OXYGEN SATURATION: 94 % | HEIGHT: 64 IN | BODY MASS INDEX: 32.95 KG/M2 | SYSTOLIC BLOOD PRESSURE: 122 MMHG | TEMPERATURE: 98.2 F

## 2025-03-06 DIAGNOSIS — N20.0 NEPHROLITHIASIS: ICD-10-CM

## 2025-03-06 DIAGNOSIS — E55.9 VITAMIN D DEFICIENCY: ICD-10-CM

## 2025-03-06 DIAGNOSIS — N30.10 INTERSTITIAL CYSTITIS: Primary | ICD-10-CM

## 2025-03-06 LAB
BILIRUB BLD-MCNC: NEGATIVE MG/DL
CLARITY, POC: CLEAR
COLOR UR: YELLOW
EXPIRATION DATE: NORMAL
GLUCOSE UR STRIP-MCNC: NEGATIVE MG/DL
KETONES UR QL: NEGATIVE
LEUKOCYTE EST, POC: NEGATIVE
Lab: NORMAL
NITRITE UR-MCNC: NEGATIVE MG/ML
PH UR: 5.5 [PH] (ref 5–8)
PROT UR STRIP-MCNC: NEGATIVE MG/DL
RBC # UR STRIP: NEGATIVE /UL
SP GR UR: 1.02 (ref 1–1.03)
UROBILINOGEN UR QL: NORMAL

## 2025-03-06 NOTE — PROGRESS NOTES
Office Visit     Patient: Ric Astorga Stone 45 y.o. female   : 1980   MRN: [id number]      Patient or patient representative verbalized consent for the use of Ambient Listening during the visit with  TANK Connolly for chart documentation. 3/6/2025  16:26 EST     Chief Complaint   Patient presents with    Follow-up     3 month check IC  Diet has been enough, not using Hydroxazine     Referring Provider: No ref. provider found    Primary Care Provider: Liana Bowling APRN     History of Present Illness  The patient presents for evaluation of interstitial cystitis and kidney stones.    Interstitial Cystitis  - Last seen in 2024, diagnosed with interstitial cystitis   - Following an elimination diet, she identified carbonated beverages, citrus foods/drinks, tomato-based products, and pineapple as triggers.  - Currently avoids citrus and carbonated water but tolerates Sprite Zero occasionally.  - Tomato products, especially marinara sauce, tomato sauce, and tomato juice, are not well-tolerated, but honey barbecue sauce is acceptable in moderation.  - Garlic and onions are problematic.  - Manages condition primarily through dietary control.  - Has not used hydroxyzine, opting for dietary modifications.  - Urobel not covered by insurance, uses Pyridium sparingly.  - Reports no current symptoms or stone-related issues.    Kidney Stones  - History of multiple kidney stones, including 2 hospitalizations during pregnancy.  - Recurrent stones since son's birth 13 years ago.  - Maintains high water intake and avoids adding salt.  - Uncertain if stones have been analyzed.  - Vitamin D levels not checked recently.    FAMILY HISTORY  Family history of kidney stones.      Subjective   Review of System:   As noted in HPI.    Past Medical History:   Diagnosis Date    Abnormal Pap smear of cervix 2008    Achilles tendon pain     Ankle fracture     Anxiety     Asthma     Cough     Fibrocystic breast      GERD (gastroesophageal reflux disease)     Hashimoto's thyroiditis     History of abnormal cervical Pap smear     : LSIL, HPV+    History of kidney stones     History of miscarriage     Immunosuppression     Interstitial cystitis     IUD (intrauterine device) in place     Mirena inserted 2018    Kidney stone     Non-toxic multinodular goiter     Osteoarthritis     Ovarian cyst     PMS (premenstrual syndrome)     Polycystic ovary syndrome 2023    PONV (postoperative nausea and vomiting) 1985    Rheumatoid arthritis     Rib pain     Urethral stricture     age 5    Urinary tract infection     Uterine fibroid     Uterine prolapse     Vitamin D deficiency      Past Surgical History:   Procedure Laterality Date    COLPOSCOPY W/ BIOPSY / CURETTAGE  10/13/2008    HSIL, HR HPV+    LEEP  2008    carcinoma in situ     Family History   Problem Relation Age of Onset    No Known Problems Mother     Diabetes type II Father     Stroke Father     Coronary artery disease Father     Thyroid disease Father     Gout Father     Kidney disease Father     Diabetes Father     Heart disease Father     Lupus Sister     Gout Brother     Diabetes type II Brother     Diabetes Brother     Diabetes Paternal Grandmother     Heart disease Paternal Grandmother     No Known Problems Son     Breast cancer Paternal Cousin         2nd cousin    Alcohol abuse Maternal Grandfather     Alcohol abuse Paternal Grandfather     Ovarian cancer Neg Hx     Colon cancer Neg Hx      Social History     Socioeconomic History    Marital status:    Tobacco Use    Smoking status: Former     Current packs/day: 0.00     Average packs/day: 0.5 packs/day for 5.0 years (2.5 ttl pk-yrs)     Types: Cigarettes     Start date: 2006     Quit date:      Years since quittin.1     Passive exposure: Past    Smokeless tobacco: Never   Vaping Use    Vaping status: Never Used   Substance and Sexual Activity    Alcohol use: Not Currently      Comment: 0-1x monthly    Drug use: Never    Sexual activity: Yes     Partners: Male     Birth control/protection: I.U.D.       Current Outpatient Medications:     albuterol sulfate  (90 Base) MCG/ACT inhaler, Inhale 2 puffs Every 4 (Four) Hours As Needed for Wheezing or Shortness of Air., Disp: 18 g, Rfl: 0    ALPRAZolam (XANAX) 0.25 MG tablet, Take 1 tablet by mouth 2 (Two) Times a Day., Disp: , Rfl:     Cholecalciferol (VITAMIN D3) 125 MCG (5000 UT) capsule capsule, Take 1 capsule by mouth Daily., Disp: , Rfl:     Enbrel SureClick 50 MG/ML solution auto-injector, Inject 1 mL under the skin into the appropriate area as directed Every 7 (Seven) Days., Disp: 4 mL, Rfl: 4    EPINEPHrine (EPIPEN) 0.3 MG/0.3ML solution auto-injector injection, INJECT 1 PEN IN THE MUSCLE ONE TIME AS DIRECTED, Disp: , Rfl:     Esomeprazole Magnesium (NEXIUM PO), Take 20 mg by mouth 2 (Two) Times a Day., Disp: , Rfl:     ipratropium-albuterol (DUO-NEB) 0.5-2.5 mg/3 ml nebulizer, Take 3 mL by nebulization Every 4 (Four) Hours As Needed for Wheezing., Disp: 360 mL, Rfl: 0    levonorgestrel (Mirena, 52 MG,) 20 MCG/24HR IUD, 1 each by Intrauterine route 1 (One) Time. Placed 7/17/2018 with Dr. Munoz, Disp: , Rfl:     levothyroxine (SYNTHROID, LEVOTHROID) 25 MCG tablet, Take 1 tablet by mouth Daily., Disp: 90 tablet, Rfl: 3    montelukast (SINGULAIR) 10 MG tablet, Take 1 tablet by mouth Every Night., Disp: , Rfl:     predniSONE (DELTASONE) 10 MG tablet, Take 1 tablet by mouth Every 12 (Twelve) Hours., Disp: , Rfl:     doxycycline (MONODOX) 100 MG capsule, Take 1 capsule by mouth Every 12 (Twelve) Hours., Disp: , Rfl:     ipratropium-albuterol (DUO-NEB) 0.5-2.5 mg/3 ml nebulizer, Nebulize q 4 h prn wheezing / shortness of breath, Disp: 360 mL, Rfl: 0    Allergies   Allergen Reactions    Azithromycin Hives    Bactrim [Sulfamethoxazole-Trimethoprim] Hives    Ceclor [Cefaclor] Hives    Keflex [Cephalexin] Hives    Penicillins Hives          "Beta lactam allergy details  Antibiotic reaction: other  Age at reaction: unknown  Dose to reaction time: unknown  Reason for antibiotic: unknown  Epinephrine required for reaction?: unknown  Tolerated antibiotics: unknown       Trimethoprim Provider Review Needed    Tamiflu [Oseltamivir Phosphate] Rash    Zofran [Ondansetron Hcl] Rash     Objective   Visit Vitals  /68   Pulse 67   Temp 98.2 °F (36.8 °C)   Ht 162.6 cm (64.02\")   Wt 87.5 kg (193 lb)   LMP  (LMP Unknown)   SpO2 94%   BMI 33.11 kg/m²        Body mass index is 33.11 kg/m².     Physical Exam  Vitals and nursing note reviewed.   Constitutional:       General: She is awake. She is not in acute distress.     Appearance: She is obese.   Pulmonary:      Effort: Pulmonary effort is normal.   Musculoskeletal:      Right hand: Deformity present.      Left hand: Deformity present.   Neurological:      Mental Status: She is alert and oriented to person, place, and time. Mental status is at baseline.   Psychiatric:         Mood and Affect: Mood normal.         Behavior: Behavior is cooperative.          Labs  Lab Results   Component Value Date    COLORU Yellow 03/06/2025    CLARITYU Clear 03/06/2025    SPECGRAV 1.020 03/06/2025    PHUR 5.5 03/06/2025    LEUKOCYTESUR Negative 03/06/2025    NITRITE Negative 03/06/2025    PROTEINPOCUA Negative 03/06/2025    GLUCOSEUR Negative 03/06/2025    KETONESU Negative 03/06/2025    UROBILINOGEN 0.2 E.U./dL 03/06/2025    BILIRUBINUR Negative 03/06/2025    RBCUR Negative 03/06/2025      Lab Results   Component Value Date    RBCUA 4-10 03/04/2014    BACTERIA TRACE 03/04/2014      Urine Culture          11/22/2024    00:00   Urine Culture   Urine Culture Final report      Lab Results   Component Value Date    WBC 3.53 06/25/2024    HGB 14.7 06/25/2024    HCT 44.4 06/25/2024    MCV 78.7 (L) 06/25/2024     06/25/2024     Lab Results   Component Value Date    GLUCOSE 73 06/25/2024    CALCIUM 9.1 06/25/2024     " "06/25/2024    K 4.0 06/25/2024    CO2 22.0 06/25/2024     06/25/2024    BUN 14 06/25/2024    BUN 9 07/19/2021    CREATININE 0.70 06/25/2024    CREATININE 0.67 07/19/2021    EGFR 108.2 06/25/2024    EGFR >60 03/04/2014    BCR 20.0 06/25/2024    ANIONGAP 11.0 06/25/2024    ALT 24 06/25/2024    AST 23 06/25/2024       No results found for: \"HGBA1C\"     No results found for: \"URICACIDSTN\", \"NSXM6ERNVZE\", \"JNXW8FPRFJ\", \"LABMAGN\", \"CAPHOSSTONE\", \"HYDROXYAPATI\"  No results found for: \"XJRM06ZN\", \"CAION\", \"PTH\", \"URICACID\"  Lab Results   Component Value Date    LABPH 5.5 03/04/2014       No results found for: \"ATOPOBIUMV\", \"BVAB2\", \"MEGASPHAER\", \"CALBICANSN\", \"CGLABRATAN\", \"CPARAPSILOS\", \"CLUSITANIAE\", \"CKRUSEI\", \"TRICHVAG\", \"CHLAMNAA\", \"NGONORRHON\", \"UREAPLASMA\", \"MYCOPLASMAG\"    Lab Results   Component Value Date    TSH 0.468 04/05/2024         Radiographic Studies   CT Abdomen Pelvis Stone Protocol    Result Date: 12/19/2024  Right nephrolithiasis without hydronephrosis.  Probable cholelithiasis, gallbladder ultrasound may be helpful.   CTDI: 14.62 mGy DLP:692.65 mGy.cm  This report was signed and finalized on 12/19/2024 9:06 AM by Talia Sims MD.        I have reviewed the above labs and imaging.       Assessment / Plan      Diagnoses and all orders for this visit:    1. Interstitial cystitis (Primary)  -     POC Urinalysis Dipstick, Automated    2. Nephrolithiasis  -     Comprehensive Metabolic Panel; Future  -     Uric Acid; Future  -     PTH, Intact & Calcium; Future  -     XR Abdomen KUB; Future  -     Litholink 24-Hour Urine, Kidney Stone -; Future    3. Vitamin D deficiency  -     Vitamin D,25-Hydroxy; Future         Assessment & Plan  1. Interstitial cystitis: Stable.  - Continue dietary modifications, avoiding triggers such as carbonated beverages, citrus foods, and tomato-based products.  - Uses Pyridium occasionally for flare-ups.  - UA unremarkable  - Contact office via MyChart if treatment adjustments " or bladder cocktail needed.    2. Kidney stones.  - CT scan in December revealed a 2.2 mm stone in the right kidney. No evidence of hydronephrosis or enlargement.  - Maintain adequate hydration and limit sodium intake based on prior litholink results  - Schedule repeat KUB x-ray next year.  - Order Litholink test to monitor condition prior to follow up in 1 year.  - Bring in any passed stones for analysis.  - Order blood work, including parathyroid, vitamin D, uric acid, and renal function tests. Tests can be done with rheumatologist or at convenience before next annual visit.    Follow-up  - Repeat KUB x-ray in one year.  - Contact office via QPDt for any treatment adjustments or bladder cocktail needs.       Return for f/u with Tran, litholink prior, labs prior, imaging prior, UA.    Tran Ko, MSN, APRN, FNP-C  [facility]

## 2025-03-12 ENCOUNTER — PATIENT ROUNDING (BHMG ONLY) (OUTPATIENT)
Dept: URGENT CARE | Facility: CLINIC | Age: 45
End: 2025-03-12
Payer: COMMERCIAL

## 2025-04-21 ENCOUNTER — TRANSCRIBE ORDERS (OUTPATIENT)
Dept: ADMINISTRATIVE | Facility: HOSPITAL | Age: 45
End: 2025-04-21
Payer: COMMERCIAL

## 2025-04-21 DIAGNOSIS — R22.9 LOCALIZED SUPERFICIAL SWELLING, MASS, OR LUMP: Primary | ICD-10-CM

## 2025-04-25 RX ORDER — LEVOTHYROXINE SODIUM 25 UG/1
25 TABLET ORAL DAILY
Qty: 90 TABLET | Refills: 1 | Status: SHIPPED | OUTPATIENT
Start: 2025-04-25

## 2025-05-05 ENCOUNTER — HOSPITAL ENCOUNTER (OUTPATIENT)
Dept: CT IMAGING | Facility: HOSPITAL | Age: 45
Discharge: HOME | End: 2025-05-05
Payer: COMMERCIAL

## 2025-05-05 DIAGNOSIS — R22.9 LOCALIZED SUPERFICIAL SWELLING, MASS, OR LUMP: ICD-10-CM

## 2025-05-05 PROCEDURE — 70490 CT SOFT TISSUE NECK W/O DYE: CPT

## 2025-05-06 NOTE — PROGRESS NOTES
Office Follow Up      Date: 05/09/2025   Patient Name: Ric Johnson  MRN: [id number]  YOB: 1980    Referring Physician: No ref. provider found     Chief Complaint: Rheumatoid arthritis      History of Present Illness: Ric Johnson is a 45 y.o. female who is here today for follow up on rheumatoid arthritis.  She has consistently been taking her Enbrel for the last year, and continues to have injection site reaction. Benadryl helped ISR.  She took her last dose yesterday.    Today patient reports feeling worse.  She reports pain severity 6/10.  With a global score 10/10. 2 hours of morning stiffness reported today.    She has fatigue all the time and tells me that she no longer feels she is living life, she is simply existing. She has had a significant amount of illness this year and had to miss a large amount of days at work.     She has difficulty with gripping or writing. She has pain to her wrists, hands, ankle, and knees    Has been diagnosed with Interstitial cystitis and has modified her diet which seems to have helped moderately reduce flares.    She was recently on steroid taper due to sinus infection, ear infection, and joint flare combined.     She does not feel that the Enbrel is effective and would like to discuss medication options today.  Today she is asking about GLP-1 medications and the potential for assisting in reducing inflammation.  She reports endocrinology and gynecology providers have previously attempted to prescribe this medication, however insurance will not cover.     Subjective   Review of Systems: Review of Systems   Positive for activity change, fatigue, ear pain, facial swelling, postnasal drip, sinus pressure, sore throat, swollen glands, leg swelling, bruising, dry skin, lesion, hair loss, Raynaud's, rash, joint pain, joint swelling, joint problems, decreased libido, cold intolerance, heat intolerance, immunocompromised, headaches,  agitation, decreased concentration, nervous/anxious, stress    Medications:   Current Outpatient Medications:     ALPRAZolam (XANAX) 0.25 MG tablet, Take 1 tablet by mouth 2 (Two) Times a Day., Disp: , Rfl:     Cholecalciferol (VITAMIN D3) 125 MCG (5000 UT) capsule capsule, Take 1 capsule by mouth Daily., Disp: , Rfl:     Enbrel SureClick 50 MG/ML solution auto-injector, Inject 1 mL under the skin into the appropriate area as directed Every 7 (Seven) Days., Disp: 4 mL, Rfl: 4    EPINEPHrine (EPIPEN) 0.3 MG/0.3ML solution auto-injector injection, INJECT 1 PEN IN THE MUSCLE ONE TIME AS DIRECTED, Disp: , Rfl:     Esomeprazole Magnesium (NEXIUM PO), Take 20 mg by mouth 2 (Two) Times a Day., Disp: , Rfl:     levonorgestrel (Mirena, 52 MG,) 20 MCG/24HR IUD, 1 each by Intrauterine route 1 (One) Time. Placed 7/17/2018 with Dr. Munoz, Disp: , Rfl:     levothyroxine (SYNTHROID, LEVOTHROID) 25 MCG tablet, Take 1 tablet by mouth Daily., Disp: 90 tablet, Rfl: 1    montelukast (SINGULAIR) 10 MG tablet, Take 1 tablet by mouth Every Night., Disp: , Rfl:     ibuprofen (ADVIL,MOTRIN) 800 MG tablet, Take 1 tablet by mouth Every 8 (Eight) Hours As Needed for Mild Pain., Disp: 90 tablet, Rfl: 6    Allergies:   Allergies   Allergen Reactions    Azithromycin Hives    Bactrim [Sulfamethoxazole-Trimethoprim] Hives    Ceclor [Cefaclor] Hives    Keflex [Cephalexin] Hives    Penicillins Hives         Beta lactam allergy details  Antibiotic reaction: other  Age at reaction: unknown  Dose to reaction time: unknown  Reason for antibiotic: unknown  Epinephrine required for reaction?: unknown  Tolerated antibiotics: unknown       Trimethoprim Provider Review Needed    Tamiflu [Oseltamivir Phosphate] Rash    Zofran [Ondansetron Hcl] Rash       I have reviewed and updated the patient's chief complaint, history of present illness, review of systems, past medical history, surgical history, family history, social history, medications and allergy list as  "appropriate.     Objective    Vital Signs:   Vitals:    05/09/25 1445   BP: 122/76   BP Location: Left leg   Patient Position: Sitting   Cuff Size: Adult   Pulse: 89   Temp: 97.6 °F (36.4 °C)   Weight: 91.2 kg (201 lb)   Height: 160 cm (63\")   PainSc: 6    PainLoc: Generalized       Body mass index is 35.61 kg/m².      Physical Exam  Constitutional:       Appearance: Normal appearance.   HENT:      Head: Normocephalic.   Eyes:      Pupils: Pupils are equal, round, and reactive to light.   Cardiovascular:      Rate and Rhythm: Regular rhythm.      Pulses: Normal pulses.      Heart sounds: Normal heart sounds.   Pulmonary:      Effort: Pulmonary effort is normal.      Breath sounds: Normal breath sounds.   Musculoskeletal:      Cervical back: Normal range of motion and neck supple.      Comments: RA nodule Left forearm near the elbow   Significant RA changes of bilateral hands.   No active synovitis   Chronic synovial swelling of scattered MCPs     Lymphadenopathy:      Head:      Right side of head: Submandibular adenopathy present.      Left side of head: Submandibular adenopathy present.   Skin:     General: Skin is warm.   Neurological:      General: No focal deficit present.      Mental Status: She is alert and oriented to person, place, and time.   Psychiatric:         Behavior: Behavior normal.         Thought Content: Thought content normal.           Assessment / Plan      Assessment & Plan  Rheumatoid arthritis involving multiple sites with positive rheumatoid factor  Seropositive, CCP +. DX 2005. MTX AND PLAQUENIL 2005. HUMIRA 2007. WEEKLY HUMIRA 2010. HUMIRA STOPPED AND ORENCIA ADDED 2013. ORENCIA STOPPED 2016. XELJANZ STARTED 2016, stopped 2023.  Enbrel started 2023..    She feels that the Enbrel is no longer working and she has been sick very often this year.   TJC: 5 SJC: 0 Physician global is 3, visual analog pain scale is 6, pt global is 10.  CDAI is 18-Moderate disease activity  Prognosis is " guarded  D/KASSIDY Ge which she has previously discussed with providers and researched.   Plan is q 3 month labs and f/u.  Labs today  Secondary osteoarthritis  Hands and wrists.  No significant change since previous visit  Immunosuppression due to drug therapy  Enbrel no longer working-discontinue  Will attempt to get Rinvoq approval at this time.   QTB and hepatitis panel today  1. Hold if the patient develops infection.   2. Avoid live vaccines while on this medication.   3. No recent serious infections  4. No injection site reactions.   5. Also hold this medication perioperatively if the patient is going to have a surgical procedure.  Chronic pain of right ankle  History of old fracture.  Deformity is stable but severe.   Recent increased pain of the ankle with current state testing at work.   Other fatigue  Significantly affecting day-to-day activities.  She reports she sleeps most of the weekend due to chronic fatigue and pain      Orders Placed This Encounter   Procedures    Comprehensive Metabolic Panel    C-reactive Protein    Sedimentation Rate    CBC (No Diff)    QuantiFERON-TB Gold Plus    Hepatic Function Panel     New Medications Ordered This Visit   Medications    ibuprofen (ADVIL,MOTRIN) 800 MG tablet     Sig: Take 1 tablet by mouth Every 8 (Eight) Hours As Needed for Mild Pain.     Dispense:  90 tablet     Refill:  6       Follow Up:   Return for Next scheduled follow up.    TANK Blanton     [facility]

## 2025-05-06 NOTE — ASSESSMENT & PLAN NOTE
Enbrel no longer working-discontinue  Will attempt to get Rinvoq approval at this time.   QTB and hepatitis panel today  1. Hold if the patient develops infection.   2. Avoid live vaccines while on this medication.   3. No recent serious infections  4. No injection site reactions.   5. Also hold this medication perioperatively if the patient is going to have a surgical procedure.

## 2025-05-06 NOTE — ASSESSMENT & PLAN NOTE
History of old fracture.  Deformity is stable but severe.   Recent increased pain of the ankle with current state testing at work.

## 2025-05-06 NOTE — ASSESSMENT & PLAN NOTE
Seropositive, CCP +. DX 2005. MTX AND PLAQUENIL 2005. HUMIRA 2007. WEEKLY HUMIRA 2010. HUMIRA STOPPED AND ORENCIA ADDED 2013. ORENCIA STOPPED 2016. XELJANZ STARTED 2016, stopped 2023.  Enbrel started 2023..    She feels that the Enbrel is no longer working and she has been sick very often this year.   TJC: 5 SJC: 0 Physician global is 3, visual analog pain scale is 6, pt global is 10.  CDAI is 18-Moderate disease activity  Prognosis is guarded  D/C KASSIDY Wade which she has previously discussed with providers and researched.   Plan is q 3 month labs and f/u.  Labs today

## 2025-05-09 ENCOUNTER — OFFICE VISIT (OUTPATIENT)
Age: 45
End: 2025-05-09
Payer: COMMERCIAL

## 2025-05-09 ENCOUNTER — TELEPHONE (OUTPATIENT)
Age: 45
End: 2025-05-09

## 2025-05-09 VITALS
DIASTOLIC BLOOD PRESSURE: 76 MMHG | HEIGHT: 63 IN | WEIGHT: 201 LBS | TEMPERATURE: 97.6 F | SYSTOLIC BLOOD PRESSURE: 122 MMHG | BODY MASS INDEX: 35.61 KG/M2 | HEART RATE: 89 BPM

## 2025-05-09 DIAGNOSIS — G89.29 CHRONIC PAIN OF RIGHT ANKLE: ICD-10-CM

## 2025-05-09 DIAGNOSIS — M25.571 CHRONIC PAIN OF RIGHT ANKLE: ICD-10-CM

## 2025-05-09 DIAGNOSIS — R53.83 OTHER FATIGUE: ICD-10-CM

## 2025-05-09 DIAGNOSIS — D84.821 IMMUNOSUPPRESSION DUE TO DRUG THERAPY: Chronic | ICD-10-CM

## 2025-05-09 DIAGNOSIS — Z79.899 IMMUNOSUPPRESSION DUE TO DRUG THERAPY: Chronic | ICD-10-CM

## 2025-05-09 DIAGNOSIS — M19.93 SECONDARY OSTEOARTHRITIS: Chronic | ICD-10-CM

## 2025-05-09 DIAGNOSIS — M05.79 RHEUMATOID ARTHRITIS INVOLVING MULTIPLE SITES WITH POSITIVE RHEUMATOID FACTOR: Primary | Chronic | ICD-10-CM

## 2025-05-09 RX ORDER — LEVOCETIRIZINE DIHYDROCHLORIDE 5 MG/1
1 TABLET, FILM COATED ORAL DAILY
COMMUNITY
Start: 2025-04-21 | End: 2025-05-09 | Stop reason: ALTCHOICE

## 2025-05-09 RX ORDER — IBUPROFEN 800 MG/1
800 TABLET, FILM COATED ORAL EVERY 8 HOURS PRN
Qty: 90 TABLET | Refills: 6 | Status: SHIPPED | OUTPATIENT
Start: 2025-05-09

## 2025-05-09 NOTE — ASSESSMENT & PLAN NOTE
Significantly affecting day-to-day activities.  She reports she sleeps most of the weekend due to chronic fatigue and pain

## 2025-05-09 NOTE — TELEPHONE ENCOUNTER
[facility] ADVISED IF PATIENT WANTS TO COME IN EARLY FOR APPT TODAY, THEY ARE MORE THAN WELCOME TO.    -ZC

## 2025-05-12 ENCOUNTER — SPECIALTY PHARMACY (OUTPATIENT)
Age: 45
End: 2025-05-12
Payer: COMMERCIAL

## 2025-05-12 NOTE — TELEPHONE ENCOUNTER
PA request has been approved and pharmacy notified (Filling pharmacy will be notified by phone, fax, or submitted prescription)    Authorized Medication: Rinvoq  Name of Insurance Approving PA: [employer]  PA Effective Dates: 5.12.25-5.12.26  Dispensing Pharmacy: [facility] copay $0

## 2025-05-12 NOTE — TELEPHONE ENCOUNTER
Prior authorization initiated by [facility].  Update will be provided when a determination has been received.     Medication: Rinvoq 15mg ER  PA Submission Method: CMM  Case Number/CMM Key: [id number]

## 2025-05-13 ENCOUNTER — TELEPHONE (OUTPATIENT)
Age: 45
End: 2025-05-13
Payer: COMMERCIAL

## 2025-05-13 ENCOUNTER — SPECIALTY PHARMACY (OUTPATIENT)
Age: 45
End: 2025-05-13
Payer: COMMERCIAL

## 2025-05-13 RX ORDER — UPADACITINIB 15 MG/1
TABLET, EXTENDED RELEASE ORAL
COMMUNITY
End: 2025-05-14 | Stop reason: SDUPTHER

## 2025-05-14 ENCOUNTER — SPECIALTY PHARMACY (OUTPATIENT)
Facility: HOSPITAL | Age: 45
End: 2025-05-14
Payer: COMMERCIAL

## 2025-05-14 RX ORDER — UPADACITINIB 15 MG/1
1 TABLET, EXTENDED RELEASE ORAL DAILY
Qty: 30 TABLET | Refills: 2 | Status: SHIPPED | OUTPATIENT
Start: 2025-05-14

## 2025-05-14 NOTE — PROGRESS NOTES
Specialty Pharmacy Patient Management Program  Rheumatology Initial Assessment     Ric Johnson was referred by an Rheumatology provider to the Rheumatology Patient Management program offered by [facility] for Rheumatoid Arthritis on 05/14/25.  An initial outreach was conducted, including assessment of therapy appropriateness and specialty medication education for Rinvoq. The patient was introduced to services offered by [facility], including: regular assessments, refill coordination, curbside pick-up or mail order delivery options, prior authorization maintenance, and financial assistance programs as applicable. The patient was also provided with contact information for the pharmacy team.     Insurance Coverage & Financial Support  [facility]     Relevant Past Medical History and Comorbidities  Relevant medical history and concomitant health conditions were discussed with the patient. The patient's chart has been reviewed for relevant past medical history and comorbid conditions and updated as necessary.  Past Medical History:   Diagnosis Date    Abnormal Pap smear of cervix 2008    Achilles tendon pain     Ankle fracture     Anxiety     Asthma     Cough     Fibrocystic breast     GERD (gastroesophageal reflux disease)     Hashimoto's thyroiditis     History of abnormal cervical Pap smear     2008: LSIL, HPV+    History of kidney stones     History of miscarriage     Immunosuppression     Interstitial cystitis     IUD (intrauterine device) in place     Mirena inserted 7/17/2018    Kidney stone     Non-toxic multinodular goiter     Osteoarthritis     Ovarian cyst     PMS (premenstrual syndrome)     Polycystic ovary syndrome 05/2023    PONV (postoperative nausea and vomiting) 1985    Rheumatoid arthritis     Rib pain     Urethral stricture     age 5    Urinary tract infection     Uterine fibroid     Uterine prolapse 2016    Vitamin D deficiency      Social  "History     Socioeconomic History    Marital status:    Tobacco Use    Smoking status: Former     Current packs/day: 0.00     Average packs/day: 0.5 packs/day for 5.0 years (2.5 ttl pk-yrs)     Types: Cigarettes     Start date: 2006     Quit date:      Years since quittin.3     Passive exposure: Past    Smokeless tobacco: Never   Vaping Use    Vaping status: Never Used   Substance and Sexual Activity    Alcohol use: Not Currently     Comment: 0-1x monthly    Drug use: Never    Sexual activity: Yes     Partners: Male     Birth control/protection: I.U.D.       Problem list reviewed by Sergey Kaba, PharmD on 2025 at  9:14 AM    Allergies  Known allergies and reactions were discussed with the patient. The patient's chart has been reviewed for  allergy information and updated as necessary.   Allergies   Allergen Reactions    Azithromycin Hives    Bactrim [Sulfamethoxazole-Trimethoprim] Hives    Ceclor [Cefaclor] Hives    Keflex [Cephalexin] Hives    Penicillins Hives         Beta lactam allergy details  Antibiotic reaction: other  Age at reaction: unknown  Dose to reaction time: unknown  Reason for antibiotic: unknown  Epinephrine required for reaction?: unknown  Tolerated antibiotics: unknown       Trimethoprim Provider Review Needed    Tamiflu [Oseltamivir Phosphate] Rash    Zofran [Ondansetron Hcl] Rash       Allergies reviewed by Sergey Kaba, PharmD on 2025 at  9:14 AM    Relevant Laboratory Values  Relevant laboratory values were discussed with the patient. The following specialty medication dose adjustment(s) are recommended: n/a    No results found for: \"HGBA1C\"  Lab Results   Component Value Date    GLUCOSE 73 2024    CALCIUM 9.1 2024     2024    K 4.0 2024    CO2 22.0 2024     2024    BUN 14 2024    CREATININE 0.70 2024    EGFRIFNONA 96 2021    BCR 20.0 2024    ANIONGAP 11.0 2024 " "    No results found for: \"CHOL\", \"CHLPL\", \"TRIG\", \"HDL\", \"LDL\", \"LDLDIRECT\"      Current Medication List  This medication list has been reviewed with the patient and evaluated for any interactions or necessary modifications/recommendations, and updated to include all prescription medications, OTC medications, and supplements the patient is currently taking.  This list reflects what is contained in the patient's profile, which has also been marked as reviewed to communicate to other providers it is the most up to date version of the patient's current medication therapy.     Current Outpatient Medications:     Upadacitinib ER (Rinvoq) 15 MG tablet sustained-release 24 hour, Take 1 tablet by mouth Daily., Disp: 30 tablet, Rfl: 2    ALPRAZolam (XANAX) 0.25 MG tablet, Take 1 tablet by mouth 2 (Two) Times a Day., Disp: , Rfl:     Cholecalciferol (VITAMIN D3) 125 MCG (5000 UT) capsule capsule, Take 1 capsule by mouth Daily., Disp: , Rfl:     Enbrel SureClick 50 MG/ML solution auto-injector, Inject 1 mL under the skin into the appropriate area as directed Every 7 (Seven) Days., Disp: 4 mL, Rfl: 4    EPINEPHrine (EPIPEN) 0.3 MG/0.3ML solution auto-injector injection, INJECT 1 PEN IN THE MUSCLE ONE TIME AS DIRECTED, Disp: , Rfl:     Esomeprazole Magnesium (NEXIUM PO), Take 20 mg by mouth 2 (Two) Times a Day., Disp: , Rfl:     ibuprofen (ADVIL,MOTRIN) 800 MG tablet, Take 1 tablet by mouth Every 8 (Eight) Hours As Needed for Mild Pain., Disp: 90 tablet, Rfl: 6    levonorgestrel (Mirena, 52 MG,) 20 MCG/24HR IUD, 1 each by Intrauterine route 1 (One) Time. Placed 7/17/2018 with Dr. Munoz, Disp: , Rfl:     levothyroxine (SYNTHROID, LEVOTHROID) 25 MCG tablet, Take 1 tablet by mouth Daily., Disp: 90 tablet, Rfl: 1    montelukast (SINGULAIR) 10 MG tablet, Take 1 tablet by mouth Every Night., Disp: , Rfl:     Medicines reviewed by Sergey Kaba, PharmD on 5/14/2025 at  9:14 AM    Drug Interactions  N/a      Initial Education " Provided for Specialty Medication  The patient has been provided with the following education and any applicable administration techniques (i.e. self-injection) have been demonstrated for the therapies indicated. All questions and concerns have been addressed prior to the patient receiving the medication, and the patient has verbalized comprehension of the education and any materials provided. Additional patient education shall be provided and documented upon request by the patient, provider, or payer.       Rinvoq (upadacitinib)         Medication Expectations   Why am I taking this medication? You are taking this medication for psoriatic arthritis, rheumatoid arthritis, atopic dermatitis, ankylosing spondylitis, or ulcerative colitis.   What should I expect while on this medication? You should expect a decrease in the frequency and severity of symptoms.   How does the medication work? Upadacitinib inhibits STEFFANY enzymes, which inhibits immune cell function. This prevents cytokines from being released, which helps decrease inflammation.   How long will I be on this medication for? The amount of time you will be on this medication will be determined by your doctor and your response to the medication.    How do I take this medication? Take as directed on your prescription label. This medication may be taken with or without food. Tablets should be taken whole; do not crush, split, or chew.    What are some possible side effects? Hypersensitivity reactions, nausea, upper respiratory tract infection, signs of a common cold, acne, or lab abnormalities.   What happens if I miss a dose? If you miss a dose, take it as soon as you remember. If it is close to the time for your next dose, skip the missed dose and go back to your normal time.             Medication Safety   What are things I should warn my doctor immediately about? Allergic reaction such as hives or trouble breathing. If you develop symptoms of infection such as  a fever or cough that does not go away, shingles, swollen glands, night sweats, skin changes such as lumps/growths or change in color or size of a mole, muscle pain or weakness, stomach pain that is new or worse, or chest pain.    What are things that I should be cautious of? Signs of a cold or upset stomach. You may have more chance of getting an infection.  Wash your hands often and stay away from people with infections, colds, or flu.   What are some medications that can interact with this one? Immunosuppressants and vaccines.            Medication Storage/Handling   How should I handle this medication? Keep this medication our of reach of pets/children in original container.  Store in the original container to protect from moisture. Wash your hands after handling. Do not handle if pregnant.   How does this medication need to be stored? Store at room temperature in original container.   How should I dispose of this medication? Take to your local police station for proper disposal or some pharmacies also have take-back bins for medication drop-off.             Resources/Support   How can I remind myself to take this medication? You can use a pill box, download a reminder delmis on your phone, or use a calandar to help remember to take your medication.   Is financial support available?  Yes, Rinvoq Complete can provide co-pay assistance if you have commercial insurance or Your Policy Manager can help with patient assistance if you have Medicare or no insurance.    Which vaccines are recommended for me? Talk to your doctor about these vaccines: Flu, Coronavirus (COVID-19), Pneumococcal (pneumonia), Tdap, Hepatitis B, Zoster (shingles).              Adherence and Self-Administration  Adherence related to the patient's specialty therapy was discussed with the patient. The Adherence segment of this outreach has been reviewed and updated.     Is there a concern with patient's ability to self administer the medication correctly and  without issue?: No  Were any potential barriers to adherence identified during the initial assessment or patient education?: No  Are there any concerns regarding the patient's understanding of the importance of medication adherence?: No  Methods for Supporting Patient Adherence and/or Self-Administration: n/a    Open Medication Therapy Problems  No medication therapy recommendations to display    Goals of Therapy  Goals related to the patient's specialty therapy were discussed with the patient. The Patient Goals segment of this outreach has been reviewed and updated.   Goals Addressed Today        Specialty Pharmacy General Goal      Reduce number of flares and pain score.                       Reassessment Plan & Follow-Up  1. Medication Therapy Changes: Rinvoq 15mg ER once daily  2. Related Plans, Therapy Recommendations, or Therapy Problems to Be Addressed: Patient new to therapy.  Patient does not have any questions or concerns about medication.  Advised patient to call direct line with any further questions.   3. Pharmacist to perform regular assessments no more than (6) months from the previous assessment.  4. Care Coordinator to set up future refill outreaches, coordinate prescription delivery, and escalate clinical questions to pharmacist.  5. Welcome information and patient satisfaction survey to be sent by specialty pharmacy team with patient's initial fill.    Attestation  Therapeutic appropriateness: Appropriate   I attest the patient was actively involved in and has agreed to the above plan of care. If the prescribed therapy is at any point deemed not appropriate based on the current or future assessments, a consultation will be initiated with the patient's specialty care provider to determine the best course of action. The revised plan of therapy will be documented along with any required assessments and/or additional patient education provided.     Sergey Kaba, PharmD  Clinical Specialty  Pharmacist, Rheumatology  5/14/2025  09:16 EDT

## 2025-05-14 NOTE — PROGRESS NOTES
Specialty Pharmacy Patient Management Program  Per Protocol Prescription Order/Refill     Patient currently fills medications at [facility] and is enrolled in an Rheumatology Patient Management Program.     Requested Prescriptions     Signed Prescriptions Disp Refills    Upadacitinib ER (Rinvoq) 15 MG tablet sustained-release 24 hour 30 tablet 2     Sig: Take 1 tablet by mouth Daily.     Prescription orders above were sent to the pharmacy per Collaborative Care Agreement Protocol.

## 2025-05-27 ENCOUNTER — TELEPHONE (OUTPATIENT)
Age: 45
End: 2025-05-27
Payer: COMMERCIAL

## 2025-05-27 RX ORDER — PREDNISONE 5 MG/1
TABLET ORAL
Qty: 30 TABLET | Refills: 0 | Status: SHIPPED | OUTPATIENT
Start: 2025-05-27 | End: 2025-06-08

## 2025-05-27 NOTE — TELEPHONE ENCOUNTER
Pt called stating that her Enbrel has been changed to Rinvoq, but the Rinvoq hasn't had time to work yet.  She said she's having a lot of pain.  She's taking Ibuprofen 800mg TID-QID, but it hurts her stomach.  She wants to know if she can get a rx for Prednisone to help her until the Rinvoq kicks in. If so, she'd like it sent to [facility] in [place].  Her number is [phone].  -MAINE Diaz

## 2025-06-09 ENCOUNTER — SPECIALTY PHARMACY (OUTPATIENT)
Age: 45
End: 2025-06-09
Payer: COMMERCIAL

## 2025-06-11 ENCOUNTER — SPECIALTY PHARMACY (OUTPATIENT)
Age: 45
End: 2025-06-11
Payer: COMMERCIAL

## 2025-06-11 NOTE — PROGRESS NOTES
Specialty Pharmacy Patient Management Program  Refill Outreach     Ric was contacted today regarding refills of their medication(s).    Refill Questions      Flowsheet Row Most Recent Value   Changes to allergies? No   Changes to medications? No   New conditions or infections since last clinic visit No   Unplanned office visit, urgent care, ED, or hospital admission in the last 4 weeks  No   How does patient/caregiver feel medication is working? Fair   Financial problems or insurance changes  No   Since the previous refill, were any specialty medication doses or scheduled injections missed or delayed?  No   Does this patient require a clinical escalation to a pharmacist? No            Delivery Questions      Flowsheet Row Most Recent Value   Delivery method [employer]   Delivery address verified with patient/caregiver? Yes   Delivery address Home   Number of medications in delivery 1   Medication(s) being filled and delivered Upadacitinib (Rinvoq)   Doses left of specialty medications 5   Copay verified? Yes   Copay amount 0   Copay form of payment No copayment ($0)   Delivery Date Selection 06/13/25   Signature Required No   Do you consent to receive electronic handouts?  Yes                 Follow-up: 23 day(s)     Tiffanie Flores, Pharmacy Technician  6/11/2025  14:28 EDT

## 2025-07-09 ENCOUNTER — SPECIALTY PHARMACY (OUTPATIENT)
Age: 45
End: 2025-07-09
Payer: COMMERCIAL

## 2025-07-09 NOTE — PROGRESS NOTES
Specialty Pharmacy Patient Management Program  Refill Outreach     Ric was contacted today regarding refills of their medication(s).    Refill Questions      Flowsheet Row Most Recent Value   Changes to allergies? No   Changes to medications? No   New conditions or infections since last clinic visit No   Unplanned office visit, urgent care, ED, or hospital admission in the last 4 weeks  No   How does patient/caregiver feel medication is working? Very good   Financial problems or insurance changes  No   Since the previous refill, were any specialty medication doses or scheduled injections missed or delayed?  No   Does this patient require a clinical escalation to a pharmacist? No            Delivery Questions      Flowsheet Row Most Recent Value   Delivery method UPS   Delivery address verified with patient/caregiver? Yes   Delivery address Home   Number of medications in delivery 1   Medication(s) being filled and delivered Upadacitinib (Rinvoq)   Doses left of specialty medications 8   Copay verified? Yes   Copay amount 0   Copay form of payment No copayment ($0)   Delivery Date Selection 07/15/25   Signature Required No   Do you consent to receive electronic handouts?  Yes                 Follow-up: 23 day(s)     Tiffanie Flores, Pharmacy Technician  7/9/2025  09:13 EDT

## 2025-07-14 ENCOUNTER — SPECIALTY PHARMACY (OUTPATIENT)
Facility: HOSPITAL | Age: 45
End: 2025-07-14
Payer: COMMERCIAL

## 2025-07-14 RX ORDER — MEDROXYPROGESTERONE ACETATE 150 MG/ML
1 INJECTION, SUSPENSION INTRAMUSCULAR
Qty: 4 ML | Refills: 1 | Status: SHIPPED | OUTPATIENT
Start: 2025-07-14

## 2025-07-29 RX ORDER — LEVOTHYROXINE SODIUM 25 UG/1
25 TABLET ORAL DAILY
Qty: 90 TABLET | Refills: 0 | Status: SHIPPED | OUTPATIENT
Start: 2025-07-29

## 2025-07-29 NOTE — TELEPHONE ENCOUNTER
Rx Refill Note  Requested Prescriptions     Pending Prescriptions Disp Refills    levothyroxine (SYNTHROID, LEVOTHROID) 25 MCG tablet 90 tablet 0     Sig: Take 1 tablet by mouth Daily.      Last office visit with prescribing clinician: 4/5/2024      Next office visit with prescribing clinician: 8/6/2025       Randi Aaron MA  07/29/25, 10:30 EDT

## 2025-07-31 ENCOUNTER — SPECIALTY PHARMACY (OUTPATIENT)
Age: 45
End: 2025-07-31
Payer: COMMERCIAL

## 2025-08-01 ENCOUNTER — SPECIALTY PHARMACY (OUTPATIENT)
Dept: GENERAL RADIOLOGY | Facility: HOSPITAL | Age: 45
End: 2025-08-01
Payer: COMMERCIAL

## 2025-08-01 RX ORDER — UPADACITINIB 15 MG/1
1 TABLET, EXTENDED RELEASE ORAL DAILY
Qty: 30 TABLET | Refills: 2 | Status: SHIPPED | OUTPATIENT
Start: 2025-08-01

## 2025-08-01 NOTE — PROGRESS NOTES
Specialty Pharmacy Patient Management Program  Per Protocol Prescription Order/Refill     Patient currently fills medications at [facility] and is enrolled in an Rheumatology Patient Management Program.     Requested Prescriptions     Signed Prescriptions Disp Refills    Upadacitinib ER (Rinvoq) 15 MG tablet sustained-release 24 hour 30 tablet 2     Sig: Take 1 tablet by mouth Daily.     Prescription orders above were sent to the pharmacy per Collaborative Care Agreement Protocol.     Rosalinda Yo PharmD, BCPS  Clinical Specialty Pharmacist, Rheumatology   8/1/2025  15:09 EDT

## 2025-08-04 ENCOUNTER — SPECIALTY PHARMACY (OUTPATIENT)
Age: 45
End: 2025-08-04
Payer: COMMERCIAL

## 2025-08-06 ENCOUNTER — SPECIALTY PHARMACY (OUTPATIENT)
Age: 45
End: 2025-08-06
Payer: COMMERCIAL

## 2025-08-06 ENCOUNTER — OFFICE VISIT (OUTPATIENT)
Age: 45
End: 2025-08-06
Payer: COMMERCIAL

## 2025-08-06 VITALS
SYSTOLIC BLOOD PRESSURE: 132 MMHG | WEIGHT: 204 LBS | DIASTOLIC BLOOD PRESSURE: 82 MMHG | HEART RATE: 86 BPM | HEIGHT: 63 IN | OXYGEN SATURATION: 98 % | BODY MASS INDEX: 36.14 KG/M2

## 2025-08-06 DIAGNOSIS — E06.3 HASHIMOTO'S THYROIDITIS: Primary | ICD-10-CM

## 2025-08-06 PROCEDURE — 84443 ASSAY THYROID STIM HORMONE: CPT | Performed by: INTERNAL MEDICINE

## 2025-08-06 RX ORDER — NITROFURANTOIN 25; 75 MG/1; MG/1
100 CAPSULE ORAL 2 TIMES DAILY
COMMUNITY

## 2025-08-07 ENCOUNTER — OFFICE VISIT (OUTPATIENT)
Age: 45
End: 2025-08-07
Payer: COMMERCIAL

## 2025-08-07 ENCOUNTER — TELEPHONE (OUTPATIENT)
Age: 45
End: 2025-08-07

## 2025-08-07 VITALS
WEIGHT: 205.6 LBS | TEMPERATURE: 98 F | HEART RATE: 86 BPM | HEIGHT: 63 IN | DIASTOLIC BLOOD PRESSURE: 64 MMHG | BODY MASS INDEX: 36.43 KG/M2 | SYSTOLIC BLOOD PRESSURE: 128 MMHG

## 2025-08-07 DIAGNOSIS — M19.93 SECONDARY OSTEOARTHRITIS: ICD-10-CM

## 2025-08-07 DIAGNOSIS — D84.821 IMMUNOSUPPRESSION DUE TO DRUG THERAPY: ICD-10-CM

## 2025-08-07 DIAGNOSIS — M25.571 CHRONIC PAIN OF RIGHT ANKLE: ICD-10-CM

## 2025-08-07 DIAGNOSIS — G89.29 CHRONIC PAIN OF RIGHT ANKLE: ICD-10-CM

## 2025-08-07 DIAGNOSIS — M05.79 RHEUMATOID ARTHRITIS INVOLVING MULTIPLE SITES WITH POSITIVE RHEUMATOID FACTOR: Primary | ICD-10-CM

## 2025-08-07 DIAGNOSIS — R53.83 OTHER FATIGUE: ICD-10-CM

## 2025-08-07 DIAGNOSIS — Z79.899 IMMUNOSUPPRESSION DUE TO DRUG THERAPY: ICD-10-CM

## 2025-08-07 LAB — TSH SERPL DL<=0.05 MIU/L-ACNC: 0.59 UIU/ML (ref 0.27–4.2)

## 2025-08-07 PROCEDURE — 99214 OFFICE O/P EST MOD 30 MIN: CPT | Performed by: NURSE PRACTITIONER

## 2025-08-08 RX ORDER — LEVOTHYROXINE SODIUM 25 UG/1
25 TABLET ORAL DAILY
Qty: 90 TABLET | Refills: 3 | Status: SHIPPED | OUTPATIENT
Start: 2025-08-08

## 2025-08-11 ENCOUNTER — RESULTS FOLLOW-UP (OUTPATIENT)
Age: 45
End: 2025-08-11
Payer: COMMERCIAL

## 2025-08-11 DIAGNOSIS — D72.819 LEUKOPENIA, UNSPECIFIED TYPE: Primary | ICD-10-CM

## 2025-08-11 DIAGNOSIS — M05.79 RHEUMATOID ARTHRITIS INVOLVING MULTIPLE SITES WITH POSITIVE RHEUMATOID FACTOR: ICD-10-CM

## 2025-08-11 DIAGNOSIS — Z79.899 IMMUNOSUPPRESSION DUE TO DRUG THERAPY: ICD-10-CM

## 2025-08-11 DIAGNOSIS — D84.821 IMMUNOSUPPRESSION DUE TO DRUG THERAPY: ICD-10-CM

## 2025-08-11 LAB
ALBUMIN SERPL-MCNC: 4.2 G/DL (ref 3.9–4.9)
ALP SERPL-CCNC: 83 IU/L (ref 44–121)
ALT SERPL-CCNC: 15 IU/L (ref 0–32)
AST SERPL-CCNC: 18 IU/L (ref 0–40)
BASOPHILS # BLD AUTO: 0 X10E3/UL (ref 0–0.2)
BASOPHILS NFR BLD AUTO: 1 %
BILIRUB SERPL-MCNC: 0.3 MG/DL (ref 0–1.2)
BUN SERPL-MCNC: 12 MG/DL (ref 6–24)
BUN/CREAT SERPL: 20 (ref 9–23)
CALCIUM SERPL-MCNC: 8.9 MG/DL (ref 8.7–10.2)
CHLORIDE SERPL-SCNC: 105 MMOL/L (ref 96–106)
CO2 SERPL-SCNC: 22 MMOL/L (ref 20–29)
CREAT SERPL-MCNC: 0.61 MG/DL (ref 0.57–1)
CRP SERPL-MCNC: 11 MG/L (ref 0–10)
EGFRCR SERPLBLD CKD-EPI 2021: 111 ML/MIN/1.73
EOSINOPHIL # BLD AUTO: 0.1 X10E3/UL (ref 0–0.4)
EOSINOPHIL NFR BLD AUTO: 3 %
ERYTHROCYTE [DISTWIDTH] IN BLOOD BY AUTOMATED COUNT: 15.3 % (ref 11.7–15.4)
ERYTHROCYTE [SEDIMENTATION RATE] IN BLOOD BY WESTERGREN METHOD: 66 MM/HR (ref 0–32)
GAMMA INTERFERON BACKGROUND BLD IA-ACNC: 0.05 IU/ML
GLOBULIN SER CALC-MCNC: 3.3 G/DL (ref 1.5–4.5)
GLUCOSE SERPL-MCNC: 106 MG/DL (ref 70–99)
HAV IGM SERPL QL IA: NEGATIVE
HBV CORE IGM SERPL QL IA: NEGATIVE
HBV SURFACE AG SERPL QL IA: NEGATIVE
HCT VFR BLD AUTO: 41.5 % (ref 34–46.6)
HCV AB SERPL QL IA: NON REACTIVE
HCV AB SERPL QL IA: NORMAL
HGB BLD-MCNC: 13.4 G/DL (ref 11.1–15.9)
IMM GRANULOCYTES # BLD AUTO: 0 X10E3/UL (ref 0–0.1)
IMM GRANULOCYTES NFR BLD AUTO: 0 %
LYMPHOCYTES # BLD AUTO: 1.1 X10E3/UL (ref 0.7–3.1)
LYMPHOCYTES NFR BLD AUTO: 38 %
M TB IFN-G BLD-IMP: NEGATIVE
M TB IFN-G CD4+ BCKGRND COR BLD-ACNC: 0.07 IU/ML
M TB IFN-G CD4+CD8+ BCKGRND COR BLD-ACNC: 0.06 IU/ML
MCH RBC QN AUTO: 25.8 PG (ref 26.6–33)
MCHC RBC AUTO-ENTMCNC: 32.3 G/DL (ref 31.5–35.7)
MCV RBC AUTO: 80 FL (ref 79–97)
MITOGEN IGNF BCKGRD COR BLD-ACNC: >10 IU/ML
MONOCYTES # BLD AUTO: 0.3 X10E3/UL (ref 0.1–0.9)
MONOCYTES NFR BLD AUTO: 10 %
NEUTROPHILS # BLD AUTO: 1.4 X10E3/UL (ref 1.4–7)
NEUTROPHILS NFR BLD AUTO: 48 %
PLATELET # BLD AUTO: 298 X10E3/UL (ref 150–450)
POTASSIUM SERPL-SCNC: 4 MMOL/L (ref 3.5–5.2)
PROT SERPL-MCNC: 7.5 G/DL (ref 6–8.5)
QUANTIFERON INCUBATION: NORMAL
RBC # BLD AUTO: 5.19 X10E6/UL (ref 3.77–5.28)
SERVICE CMNT-IMP: NORMAL
SODIUM SERPL-SCNC: 141 MMOL/L (ref 134–144)
WBC # BLD AUTO: 2.9 X10E3/UL (ref 3.4–10.8)

## 2025-08-21 ENCOUNTER — PATIENT ROUNDING (BHMG ONLY) (OUTPATIENT)
Dept: URGENT CARE | Facility: CLINIC | Age: 45
End: 2025-08-21
Payer: COMMERCIAL